# Patient Record
Sex: MALE | Race: NATIVE HAWAIIAN OR OTHER PACIFIC ISLANDER
[De-identification: names, ages, dates, MRNs, and addresses within clinical notes are randomized per-mention and may not be internally consistent; named-entity substitution may affect disease eponyms.]

---

## 2019-12-22 ENCOUNTER — HOSPITAL ENCOUNTER (EMERGENCY)
Dept: HOSPITAL 15 - ER | Age: 49
Discharge: LEFT BEFORE DISCHARGE | End: 2019-12-22
Payer: MEDICARE

## 2019-12-22 DIAGNOSIS — L97.509: Primary | ICD-10-CM

## 2019-12-22 DIAGNOSIS — Z53.21: ICD-10-CM

## 2020-08-20 ENCOUNTER — HOSPITAL ENCOUNTER (INPATIENT)
Dept: HOSPITAL 15 - ER | Age: 50
LOS: 5 days | Discharge: HOME HEALTH | DRG: 871 | End: 2020-08-25
Attending: INTERNAL MEDICINE | Admitting: NURSE PRACTITIONER
Payer: MEDICARE

## 2020-08-20 VITALS — BODY MASS INDEX: 33.69 KG/M2 | WEIGHT: 254.19 LBS | HEIGHT: 73 IN

## 2020-08-20 DIAGNOSIS — L97.529: ICD-10-CM
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
## 2020-08-20 DIAGNOSIS — E83.39: ICD-10-CM

## 2020-08-20 DIAGNOSIS — Z99.2: ICD-10-CM

## 2020-08-20 DIAGNOSIS — E11.621: ICD-10-CM

## 2020-08-20 DIAGNOSIS — I12.0: ICD-10-CM

## 2020-08-20 DIAGNOSIS — E78.5: ICD-10-CM

## 2020-08-20 DIAGNOSIS — N25.81: ICD-10-CM

## 2020-08-20 DIAGNOSIS — Z20.828: ICD-10-CM

## 2020-08-20 DIAGNOSIS — S91.302A: ICD-10-CM

## 2020-08-20 DIAGNOSIS — N18.6: ICD-10-CM

## 2020-08-20 DIAGNOSIS — Z83.3: ICD-10-CM

## 2020-08-20 DIAGNOSIS — E87.1: ICD-10-CM
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
## 2020-08-20 DIAGNOSIS — E87.5: ICD-10-CM
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
## 2020-08-20 DIAGNOSIS — E11.22: ICD-10-CM

## 2020-08-20 DIAGNOSIS — I48.20: ICD-10-CM

## 2020-08-20 DIAGNOSIS — A41.01: Primary | ICD-10-CM

## 2020-08-20 DIAGNOSIS — I77.810: ICD-10-CM
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
## 2020-08-20 DIAGNOSIS — E66.9: ICD-10-CM

## 2020-08-20 DIAGNOSIS — D64.9: ICD-10-CM

## 2020-08-20 LAB
ALBUMIN SERPL-MCNC: 3.8 G/DL (ref 3.4–5)
ALP SERPL-CCNC: 140 U/L (ref 45–117)
ALT SERPL-CCNC: 24 U/L (ref 16–61)
ANION GAP SERPL CALCULATED.3IONS-SCNC: 13 MMOL/L (ref 5–15)
APTT PPP: 33.1 SEC (ref 23–31.2)
BILIRUB SERPL-MCNC: 1.7 MG/DL (ref 0.2–1)
BUN SERPL-MCNC: 68 MG/DL (ref 7–18)
BUN/CREAT SERPL: 4.8
CALCIUM SERPL-MCNC: 9.5 MG/DL (ref 8.5–10.1)
CHLORIDE SERPL-SCNC: 92 MMOL/L (ref 98–107)
CO2 SERPL-SCNC: 20 MMOL/L (ref 21–32)
GLUCOSE SERPL-MCNC: 93 MG/DL (ref 74–106)
HCT VFR BLD AUTO: 43.8 % (ref 41–53)
HGB BLD-MCNC: 14.2 G/DL (ref 13.5–17.5)
INR PPP: 1.06 (ref 0.9–1.15)
MAGNESIUM SERPL-MCNC: 2.9 MG/DL (ref 1.6–2.6)
MCH RBC QN AUTO: 26.6 PG (ref 28–32)
MCV RBC AUTO: 81.9 FL (ref 80–100)
NRBC BLD QL AUTO: 0 %
POTASSIUM SERPL-SCNC: 6.5 MMOL/L (ref 3.5–5.1)
PROT SERPL-MCNC: 10.5 G/DL (ref 6.4–8.2)
SODIUM SERPL-SCNC: 125 MMOL/L (ref 136–145)

## 2020-08-20 PROCEDURE — 96375 TX/PRO/DX INJ NEW DRUG ADDON: CPT

## 2020-08-20 PROCEDURE — 71045 X-RAY EXAM CHEST 1 VIEW: CPT

## 2020-08-20 PROCEDURE — 36600 WITHDRAWAL OF ARTERIAL BLOOD: CPT

## 2020-08-20 PROCEDURE — 82728 ASSAY OF FERRITIN: CPT

## 2020-08-20 PROCEDURE — 85379 FIBRIN DEGRADATION QUANT: CPT

## 2020-08-20 PROCEDURE — 85610 PROTHROMBIN TIME: CPT

## 2020-08-20 PROCEDURE — 87426 SARSCOV CORONAVIRUS AG IA: CPT

## 2020-08-20 PROCEDURE — 85730 THROMBOPLASTIN TIME PARTIAL: CPT

## 2020-08-20 PROCEDURE — 85025 COMPLETE CBC W/AUTO DIFF WBC: CPT

## 2020-08-20 PROCEDURE — 02HV33Z INSERTION OF INFUSION DEVICE INTO SUPERIOR VENA CAVA, PERCUTANEOUS APPROACH: ICD-10-PCS | Performed by: STUDENT IN AN ORGANIZED HEALTH CARE EDUCATION/TRAINING PROGRAM

## 2020-08-20 PROCEDURE — 84443 ASSAY THYROID STIM HORMONE: CPT

## 2020-08-20 PROCEDURE — 82565 ASSAY OF CREATININE: CPT

## 2020-08-20 PROCEDURE — 87147 CULTURE TYPE IMMUNOLOGIC: CPT

## 2020-08-20 PROCEDURE — 87077 CULTURE AEROBIC IDENTIFY: CPT

## 2020-08-20 PROCEDURE — 96361 HYDRATE IV INFUSION ADD-ON: CPT

## 2020-08-20 PROCEDURE — 87081 CULTURE SCREEN ONLY: CPT

## 2020-08-20 PROCEDURE — 87040 BLOOD CULTURE FOR BACTERIA: CPT

## 2020-08-20 PROCEDURE — 83880 ASSAY OF NATRIURETIC PEPTIDE: CPT

## 2020-08-20 PROCEDURE — 86141 C-REACTIVE PROTEIN HS: CPT

## 2020-08-20 PROCEDURE — 84484 ASSAY OF TROPONIN QUANT: CPT

## 2020-08-20 PROCEDURE — 93005 ELECTROCARDIOGRAM TRACING: CPT

## 2020-08-20 PROCEDURE — 82805 BLOOD GASES W/O2 SATURATION: CPT

## 2020-08-20 PROCEDURE — 96367 TX/PROPH/DG ADDL SEQ IV INF: CPT

## 2020-08-20 PROCEDURE — 83605 ASSAY OF LACTIC ACID: CPT

## 2020-08-20 PROCEDURE — 87186 SC STD MICRODIL/AGAR DIL: CPT

## 2020-08-20 PROCEDURE — 80202 ASSAY OF VANCOMYCIN: CPT

## 2020-08-20 PROCEDURE — 96365 THER/PROPH/DIAG IV INF INIT: CPT

## 2020-08-20 PROCEDURE — 84132 ASSAY OF SERUM POTASSIUM: CPT

## 2020-08-20 PROCEDURE — 36415 COLL VENOUS BLD VENIPUNCTURE: CPT

## 2020-08-20 PROCEDURE — 93306 TTE W/DOPPLER COMPLETE: CPT

## 2020-08-20 PROCEDURE — 83735 ASSAY OF MAGNESIUM: CPT

## 2020-08-20 PROCEDURE — 80048 BASIC METABOLIC PNL TOTAL CA: CPT

## 2020-08-20 PROCEDURE — 80053 COMPREHEN METABOLIC PANEL: CPT

## 2020-08-20 PROCEDURE — 99291 CRITICAL CARE FIRST HOUR: CPT

## 2020-08-20 PROCEDURE — 73700 CT LOWER EXTREMITY W/O DYE: CPT

## 2020-08-21 VITALS — SYSTOLIC BLOOD PRESSURE: 129 MMHG | DIASTOLIC BLOOD PRESSURE: 79 MMHG

## 2020-08-21 VITALS — SYSTOLIC BLOOD PRESSURE: 156 MMHG | DIASTOLIC BLOOD PRESSURE: 67 MMHG

## 2020-08-21 VITALS — DIASTOLIC BLOOD PRESSURE: 97 MMHG | SYSTOLIC BLOOD PRESSURE: 137 MMHG

## 2020-08-21 VITALS — SYSTOLIC BLOOD PRESSURE: 84 MMHG | DIASTOLIC BLOOD PRESSURE: 53 MMHG

## 2020-08-21 VITALS — SYSTOLIC BLOOD PRESSURE: 133 MMHG | DIASTOLIC BLOOD PRESSURE: 66 MMHG

## 2020-08-21 VITALS — SYSTOLIC BLOOD PRESSURE: 121 MMHG | DIASTOLIC BLOOD PRESSURE: 61 MMHG

## 2020-08-21 PROCEDURE — 5A1D70Z PERFORMANCE OF URINARY FILTRATION, INTERMITTENT, LESS THAN 6 HOURS PER DAY: ICD-10-PCS | Performed by: STUDENT IN AN ORGANIZED HEALTH CARE EDUCATION/TRAINING PROGRAM

## 2020-08-21 RX ADMIN — ALBUTEROL SULFATE SCH MCG: 108 AEROSOL, METERED RESPIRATORY (INHALATION) at 22:00

## 2020-08-21 RX ADMIN — ALBUTEROL SULFATE SCH MCG: 108 AEROSOL, METERED RESPIRATORY (INHALATION) at 07:30

## 2020-08-21 RX ADMIN — PANTOPRAZOLE SODIUM SCH MG: 40 TABLET, DELAYED RELEASE ORAL at 11:00

## 2020-08-21 RX ADMIN — PIPERACILLIN SODIUM AND TAZOBACTAM SODIUM SCH MLS/HR: .25; 2 INJECTION, POWDER, LYOPHILIZED, FOR SOLUTION INTRAVENOUS at 11:00

## 2020-08-21 RX ADMIN — ALBUTEROL SULFATE SCH MCG: 108 AEROSOL, METERED RESPIRATORY (INHALATION) at 14:20

## 2020-08-21 RX ADMIN — PIPERACILLIN SODIUM AND TAZOBACTAM SODIUM SCH MLS/HR: .25; 2 INJECTION, POWDER, LYOPHILIZED, FOR SOLUTION INTRAVENOUS at 21:30

## 2020-08-21 NOTE — NUR
HR SUSTAINING:

'S PATIENT NOW REPORTING," FEELING BAD." O2 96% ON 4LNC. /67MMHG, TEMP 100/7 RR 
20. MD BURROUGHS PAGED. ORDERS RECEIVED.

## 2020-08-21 NOTE — NUR
MD JOHNSON REGARDING BP:

ORDER TO HOLD BP MEDS. ALSO QUESTIONED FOR ACCURACY. WILL RE-ASSESS BP WITH MORE PATIENT 
EDUCATION TO INCREASE COMPLIANCE.

## 2020-08-21 NOTE — NUR
ROUNDS



Patient's temperature is 97.9.  Patient is resting in bed saturating at 98% on 2LNC. No 
distress noted and patient denies pain.  Patient continues on amiodarone drip.  Will adjust 
dose after 6 hours from the start of infusion as ordered.

## 2020-08-21 NOTE — NUR
OPENING SHIFT NOTE:

PATIENT RESTING IN BED, EATING BREAKFAST. A/OX4 PATIENT RESPIRATIONS EVEN AND UNLABORED. 
UPDATED CARE BOARD, FALL PRECAUTIONS IN PLACE, CALL LIGHT WITHIN REACH, WILL CONTINUE TO 
MONITOR.

## 2020-08-21 NOTE — NUR
MD CALLED BACK

[facility] CALL[facility] BACK AND STATES THAT THEY WILL PUT AN ORDER TO DO DIALYSIS.

## 2020-08-21 NOTE — NUR
DIALYSIS:

CALL FROM [facility] WITH [facility]. WILL BE AT HOSPITAL IN APPROX 1 HOUR FOR DIALYSIS 
TREATMENT.

## 2020-08-21 NOTE — NUR
NEW IV:

AFTER SECOND ATTEMPT, PIV STATED IN THE LEFT FA, 22G. EDUCATION GIVEN REGARDING NEW IV CARE.

## 2020-08-21 NOTE — NUR
Received a call from Kenya Acosta CM at  [facility] and stated if we need anything from 
them to call and they will help arrange when the patient is ready to go home. [facility] 
[phone].

## 2020-08-21 NOTE — NUR
MD CALLED



DR DACOSTA CALLED AND ASKED RN TO CALL DR [facility] RIGHT AWAY R/T PATIENT'S POTASSIUM OF 6.5 AT 
2100 8/20/20

## 2020-08-21 NOTE — NUR
Opening Shift Note

Received report from twila Camargo RN.  Assumed care of patient, awake and alert.  No S/S of 
distress/SOB or pain.  Instructed on POC and to call for assist PRN, will continue to 
monitor for changes Q1hr and PRN.

## 2020-08-21 NOTE — NUR
Respiratory note:

MEDICATION HELD AT THIS TIME UNTIL COVID RESULTS RETURN. PT CURRENTLY ON NC2L SPO2 100%, HR 
84, RR 20. BS CLEAR. NO SOB OR DISTRESS NOTED.

## 2020-08-21 NOTE — NUR
Nutrition Consult/assessment Note



please see attached link for complete assessment



Est energy needs ABW 99 [id number] kcal (27-30kcal/kg ABW) Est protein needs: 118-128g 
(1.2-1.3g/kg ABW r/t HD wounds) Will reassess prn. 




-------------------------------------------------------------------------------

Addendum: 20 at 1244 by Jessica Santana RD

-------------------------------------------------------------------------------

Amended: Links added.

## 2020-08-21 NOTE — NUR
BP:

PATIENT BP REPORTED FROM CNA 89/56MMHG AFTER SEVERAL ATTEMPTS AFTER, "THE MACHINE WASN'T 
READING HIM." THIS RN CONNECTED LEFT FA TO REMOTE BP TELE MONITOR, NOTED TO BE AT 68/28 THIS 
RN SUSPECTING POOR ACCURACY READING. PATIENT ASYMPTOMATIC, A/OX4. WILL INFORM MD.

## 2020-08-21 NOTE — NUR
IV SELF DISCONTINUED:

BACK FROM LUNCH, DIALYSIS RN REPORTED HE PULLED OUT HIS IV WHEN TURNING IN BED. SHE APPLIED 
MANUAL PRESSURE, CATHETER INTACT.

## 2020-08-21 NOTE — NUR
Patient is restless and states he does not feel good. Given some cooling measures, ice 
packs. Will monitor temperature.   Patient is asking for a drink of juice and some ice 
chips.

## 2020-08-21 NOTE — NUR
Respiratory note:

PT IS AWAKE, AND ALERT.  NO RESPIRATORY DISTRESS NOTED.   MDI THERAPY HELD DUE TO PENDING 
TEST RESULTS.  SPO2 95% ON RA, HR 96, RR 18, BS CLEAR/DIMINISHED BILATERALLY.  NO FURTHER 
RESPIRATORY INTERVENTIONS INDICATED AT THIS TIME.  WILL CONTINUE TO MONITOR PT.  CHARTING 
COMPLETE FROM OUTSIDE OF PT ROOM PER COVID-19 PRECAUTIONS/PROTOCOL.

## 2020-08-21 NOTE — NUR
Respiratory note:

PT IS AWAKE, AND ALERT.  NO RESPIRATORY DISTRESS NOTED.   MDI THERAPY HELD DUE TO PENDING 
TEST RESULTS.  SPO2 98% ON RA, HR 89, RR 18, BS CLEAR/DIMINISHED BILATERALLY.  NO FURTHER 
RESPIRATORY INTERVENTIONS INDICATED AT THIS TIME.  WILL CONTINUE TO MONITOR PT.  CHARTING 
COMPLETE FROM OUTSIDE OF PT ROOM PER COVID-19 PRECAUTIONS/PROTOCOL.

## 2020-08-21 NOTE — NUR
MEDICATION RECONCILIATION



PATIENT STATES HE IS TAKING [place] BUT CANNOT REMEMBER WHAT DOSE.  PATIENT CANNOT REMEMBER 
HIS WIFE'S TELEPHONE NUMBER AND REFUSED TO HAVE PASSWORD ON FILE.

## 2020-08-21 NOTE — NUR
WOUND CARE PICS:

REPORT RECEIVED FROM CONCHA HERNANDEZ. PER CONCHA, SHE TOOK WOUND CARE PICS AND PLACED A DIETARY 
CONSULT.

## 2020-08-21 NOTE — NUR
Telemetry admit from ER

ESTEBAN STANTON admitted to Telemetry unit after SBAR received.  Patient oriented to CONCHA CASTREJON RN primary RN, unit, room, bed, and unit policies regarding patient care and visiting 
hours. Patient now on continuous telemetry monitoring, tele box # 10 and telemetry reading 
on arrival to unit is Sinus Rhythm at 86BPM. Patient placed on bedside oxygen, weighed by 
bedscale and encouraged to call if they need something. All questions and concerns 
addressed, patient verbalized understanding. 

Note: Patient is alert and oriented, very tired and sleepy, hard to communicate. Patient is 
saturating 98% on room air, no distress noted.  Patient has wound to left lateral foot, no 
drainage noted, no odor noted and dressing changed.

## 2020-08-21 NOTE — NUR
WOUND CARE NOTE: WOUND CARE IN TO SEE PATIENT PER WOUND CARE REQUEST. PATIENT ADMITTED TO 
[facility] FOR SEPSIS. BEDSIDE NURSE NOTED SKIN INTEGRITY ISSUE UPON ADMISSION. PHOTOGRAPH TAKEN AT 
THAT TIME FOR REFERENCE. PATIENT HAS A DIABETIC FOOT ULCER TO THE LATERAL ASPECT OF THE LEFT 
FOOT. PATIENT RACHEL SCORE IS 19. NO OTHER WOUNDS NOTED. 



RECOMMEND: PODIATRY CONSULT. SKIN/WOUND CARE PLAN. CONTINUED MONITORING BY WOUND CARE TEAM.

-------------------------------------------------------------------------------

Addendum: 08/21/20 at 1736 by HERNAN AVINA RN RN

-------------------------------------------------------------------------------

Amended: Links added.

## 2020-08-22 VITALS — DIASTOLIC BLOOD PRESSURE: 79 MMHG | SYSTOLIC BLOOD PRESSURE: 164 MMHG

## 2020-08-22 VITALS — DIASTOLIC BLOOD PRESSURE: 55 MMHG | SYSTOLIC BLOOD PRESSURE: 103 MMHG

## 2020-08-22 VITALS — DIASTOLIC BLOOD PRESSURE: 48 MMHG | SYSTOLIC BLOOD PRESSURE: 92 MMHG

## 2020-08-22 VITALS — SYSTOLIC BLOOD PRESSURE: 121 MMHG | DIASTOLIC BLOOD PRESSURE: 81 MMHG

## 2020-08-22 VITALS — SYSTOLIC BLOOD PRESSURE: 95 MMHG | DIASTOLIC BLOOD PRESSURE: 57 MMHG

## 2020-08-22 LAB
ALBUMIN SERPL-MCNC: 2.5 G/DL (ref 3.4–5)
ALP SERPL-CCNC: 133 U/L (ref 45–117)
ALT SERPL-CCNC: 24 U/L (ref 16–61)
ANION GAP SERPL CALCULATED.3IONS-SCNC: 8 MMOL/L (ref 5–15)
BILIRUB SERPL-MCNC: 1.3 MG/DL (ref 0.2–1)
BUN SERPL-MCNC: 55 MG/DL (ref 7–18)
BUN/CREAT SERPL: 4.4
CALCIUM SERPL-MCNC: 8.7 MG/DL (ref 8.5–10.1)
CHLORIDE SERPL-SCNC: 93 MMOL/L (ref 98–107)
CO2 SERPL-SCNC: 26 MMOL/L (ref 21–32)
GLUCOSE SERPL-MCNC: 108 MG/DL (ref 74–106)
HCT VFR BLD AUTO: 36.1 % (ref 41–53)
HGB BLD-MCNC: 11.7 G/DL (ref 13.5–17.5)
MCH RBC QN AUTO: 26.4 PG (ref 28–32)
MCV RBC AUTO: 81.2 FL (ref 80–100)
NRBC BLD QL AUTO: 0.1 %
POTASSIUM SERPL-SCNC: 4.3 MMOL/L (ref 3.5–5.1)
PROT SERPL-MCNC: 7.7 G/DL (ref 6.4–8.2)
SODIUM SERPL-SCNC: 127 MMOL/L (ref 136–145)

## 2020-08-22 RX ADMIN — PIPERACILLIN SODIUM AND TAZOBACTAM SODIUM SCH MLS/HR: .25; 2 INJECTION, POWDER, LYOPHILIZED, FOR SOLUTION INTRAVENOUS at 09:32

## 2020-08-22 RX ADMIN — AMIODARONE HYDROCHLORIDE SCH MLS/HR: 50 INJECTION, SOLUTION INTRAVENOUS at 04:33

## 2020-08-22 RX ADMIN — PANTOPRAZOLE SODIUM SCH MG: 40 TABLET, DELAYED RELEASE ORAL at 09:06

## 2020-08-22 RX ADMIN — AMIODARONE HYDROCHLORIDE SCH MLS/HR: 50 INJECTION, SOLUTION INTRAVENOUS at 13:07

## 2020-08-22 RX ADMIN — PIPERACILLIN SODIUM AND TAZOBACTAM SODIUM SCH MLS/HR: .25; 2 INJECTION, POWDER, LYOPHILIZED, FOR SOLUTION INTRAVENOUS at 22:03

## 2020-08-22 NOTE — NUR
Pt Arrived on Unit

Pt arrived on unit to 292B from 244-2 via wheelchair. Pt is a/ox4 with no s/s of distress or 
SOB. Pt placed on 2L via NC. Amiodarone drip currently running at 16.67mL/hr. Pt able to 
ambulate to bed without difficulty. Safety measures initiated with call light within reach, 
bed in lowest position and side rails up. Will continue to monitor.

## 2020-08-22 NOTE — NUR
Received report from lab by Maria D that patient's  Creatinine is 12.40 down from 2 days ago 
of 14.10.  Hospitalist paged.  Awaiting call back.  twila Kang nurse notified.

## 2020-08-22 NOTE — NUR
Started amiodarone drip dose of 0.5mg/hour.  Patient is alert and oriented, no distress 
noted and patient denies pain.

## 2020-08-22 NOTE — NUR
PT RESULTED NEGATIVE FOR COVID INFECTION. CALLED TELE NURSE TO GIVE REPORT. PT TRANSPORTED 
TO TELE FLOOR WITH DAVID MEDEIROS ACCEPTING PT FROM COVID UNIT. NO S/S OF SOB OR DISTRESS AT TIME 
OF TRANSFER.

## 2020-08-23 VITALS — SYSTOLIC BLOOD PRESSURE: 125 MMHG | DIASTOLIC BLOOD PRESSURE: 76 MMHG

## 2020-08-23 VITALS — DIASTOLIC BLOOD PRESSURE: 84 MMHG | SYSTOLIC BLOOD PRESSURE: 151 MMHG

## 2020-08-23 VITALS — DIASTOLIC BLOOD PRESSURE: 81 MMHG | SYSTOLIC BLOOD PRESSURE: 146 MMHG

## 2020-08-23 VITALS — SYSTOLIC BLOOD PRESSURE: 147 MMHG | DIASTOLIC BLOOD PRESSURE: 72 MMHG

## 2020-08-23 VITALS — DIASTOLIC BLOOD PRESSURE: 74 MMHG | SYSTOLIC BLOOD PRESSURE: 166 MMHG

## 2020-08-23 VITALS — SYSTOLIC BLOOD PRESSURE: 133 MMHG | DIASTOLIC BLOOD PRESSURE: 58 MMHG

## 2020-08-23 LAB
ALBUMIN SERPL-MCNC: 2.6 G/DL (ref 3.4–5)
ALP SERPL-CCNC: 174 U/L (ref 45–117)
ALT SERPL-CCNC: 23 U/L (ref 16–61)
ANION GAP SERPL CALCULATED.3IONS-SCNC: 13 MMOL/L (ref 5–15)
BILIRUB SERPL-MCNC: 1 MG/DL (ref 0.2–1)
BUN SERPL-MCNC: 59 MG/DL (ref 7–18)
BUN/CREAT SERPL: 4.2
CALCIUM SERPL-MCNC: 8.4 MG/DL (ref 8.5–10.1)
CHLORIDE SERPL-SCNC: 92 MMOL/L (ref 98–107)
CO2 SERPL-SCNC: 22 MMOL/L (ref 21–32)
GLUCOSE SERPL-MCNC: 87 MG/DL (ref 74–106)
HCT VFR BLD AUTO: 33.5 % (ref 41–53)
HGB BLD-MCNC: 11.4 G/DL (ref 13.5–17.5)
MCH RBC QN AUTO: 27.1 PG (ref 28–32)
MCV RBC AUTO: 80 FL (ref 80–100)
NRBC BLD QL AUTO: 0.1 %
POTASSIUM SERPL-SCNC: 4.5 MMOL/L (ref 3.5–5.1)
PROT SERPL-MCNC: 7.7 G/DL (ref 6.4–8.2)
SODIUM SERPL-SCNC: 127 MMOL/L (ref 136–145)

## 2020-08-23 RX ADMIN — METOPROLOL TARTRATE SCH MG: 25 TABLET, FILM COATED ORAL at 22:15

## 2020-08-23 RX ADMIN — AMIODARONE HYDROCHLORIDE SCH MLS/HR: 50 INJECTION, SOLUTION INTRAVENOUS at 03:45

## 2020-08-23 RX ADMIN — PIPERACILLIN SODIUM AND TAZOBACTAM SODIUM SCH MLS/HR: .25; 2 INJECTION, POWDER, LYOPHILIZED, FOR SOLUTION INTRAVENOUS at 08:46

## 2020-08-23 RX ADMIN — PANTOPRAZOLE SODIUM SCH MG: 40 TABLET, DELAYED RELEASE ORAL at 08:46

## 2020-08-23 RX ADMIN — METOPROLOL TARTRATE SCH MG: 25 TABLET, FILM COATED ORAL at 13:31

## 2020-08-23 RX ADMIN — PIPERACILLIN SODIUM AND TAZOBACTAM SODIUM SCH MLS/HR: .25; 2 INJECTION, POWDER, LYOPHILIZED, FOR SOLUTION INTRAVENOUS at 22:15

## 2020-08-23 NOTE — NUR
Critical Lab Value

Creatine of 14.1 reported, previously 12.4 yesterday, 8/22. Paged [facility] to report 
value. Pt is scheduled for dialysis today. Pt is currently asymptomatic. Will continue to 
monitor.

## 2020-08-23 NOTE — NUR
Opening Note

Assumed pt care from NOC RN. PT is a/ox4 with no s/s of distress or SOB. Pt is currently 
sitting upright in bed with no complaints at this time. Pt is currently still running 
Amiodarone at 16mL/hr, will notify MD of running medication. Discussed POC with pt and 
pending Dialysis orders, pt verbalized understanding. Safety measures maintained with call 
light within reach, bed in lowest position and side rails up. Will continue to monitor for 
changes.

## 2020-08-23 NOTE — NUR
Dr Swartz at Bedside

MD to see pt. Informed MD on amidarone MD chasidy states to continue this current bag and then 
to d/c. MD aware of pending podiatry as well as CT results. Will continue to monitor for 
changes.

## 2020-08-23 NOTE — NUR
Dialysis Complete

Per Dialysis RN, 2.5L removed, last BP of 147/72 with a HR of 63. Pt tolerated treatment 
well. Will continue to monitor.

## 2020-08-24 VITALS — SYSTOLIC BLOOD PRESSURE: 151 MMHG | DIASTOLIC BLOOD PRESSURE: 77 MMHG

## 2020-08-24 VITALS — DIASTOLIC BLOOD PRESSURE: 81 MMHG | SYSTOLIC BLOOD PRESSURE: 190 MMHG

## 2020-08-24 VITALS — SYSTOLIC BLOOD PRESSURE: 168 MMHG | DIASTOLIC BLOOD PRESSURE: 79 MMHG

## 2020-08-24 VITALS — DIASTOLIC BLOOD PRESSURE: 81 MMHG | SYSTOLIC BLOOD PRESSURE: 158 MMHG

## 2020-08-24 VITALS — SYSTOLIC BLOOD PRESSURE: 146 MMHG | DIASTOLIC BLOOD PRESSURE: 71 MMHG

## 2020-08-24 LAB
ANION GAP SERPL CALCULATED.3IONS-SCNC: 10 MMOL/L (ref 5–15)
BUN SERPL-MCNC: 44 MG/DL (ref 7–18)
BUN/CREAT SERPL: 3.6
CALCIUM SERPL-MCNC: 8.6 MG/DL (ref 8.5–10.1)
CHLORIDE SERPL-SCNC: 95 MMOL/L (ref 98–107)
CO2 SERPL-SCNC: 25 MMOL/L (ref 21–32)
GLUCOSE SERPL-MCNC: 98 MG/DL (ref 74–106)
POTASSIUM SERPL-SCNC: 4.5 MMOL/L (ref 3.5–5.1)
SODIUM SERPL-SCNC: 130 MMOL/L (ref 136–145)

## 2020-08-24 RX ADMIN — METOPROLOL TARTRATE SCH MG: 25 TABLET, FILM COATED ORAL at 22:00

## 2020-08-24 RX ADMIN — PANTOPRAZOLE SODIUM SCH MG: 40 TABLET, DELAYED RELEASE ORAL at 10:23

## 2020-08-24 RX ADMIN — METOPROLOL TARTRATE SCH MG: 25 TABLET, FILM COATED ORAL at 10:35

## 2020-08-24 RX ADMIN — PIPERACILLIN SODIUM AND TAZOBACTAM SODIUM SCH MLS/HR: .25; 2 INJECTION, POWDER, LYOPHILIZED, FOR SOLUTION INTRAVENOUS at 10:23

## 2020-08-24 RX ADMIN — PIPERACILLIN SODIUM AND TAZOBACTAM SODIUM SCH MLS/HR: .25; 2 INJECTION, POWDER, LYOPHILIZED, FOR SOLUTION INTRAVENOUS at 22:00

## 2020-08-24 NOTE — NUR
Nutrition Followup Note



Pt wt is 113.4 kg



Pt was sleeping with no relatives at bedside when rounded this morning. Pt is with a Renal 
Standard diet, appetite is good aeb ave 75% PO intake per RN doc.  Pt is active for HD.



Est energy needs ABW 99 [id number] kcal (27-30kcal/kg ABW) 

Est protein needs: 118-128g (1.2-1.3g/kg ABW r/t HD wounds) 

Will reassess prn. 



LABS:  BUN 59 H, Cr 14.10 H, GFR 4 L, Ca 8.4 L, Alb 2.6 L



GI: Pt had 1 BM on  per RN doc



BS: 19 low risk. Refer to wound assessment report for full details.



PES:

1) Altered nutrition related lab values r.t current chronic medical condition aeb elev RFT 
hyperbili

2) Decreased nutrient needs r/t adiposity aeb pt`s high BMI of 33.9 kg/m2



Comments 

1) Consider nephrovite daily and Vit C

2) Refer to [facility] dietitian on DC

3) Continue current plan of care

## 2020-08-24 NOTE — NUR
Received a call from Evangelina FOURNIER at [facility]  [phone]  and stated if the 
patient has any discharge needs to give her a call and she will help to arrange.

## 2020-08-24 NOTE — NUR
Opening Shift Note

Assumed care of patient, awake and alert x4. No S/S of distress/SOB or pain. Call light is 
within reach, side rails up x2, bed is in the lowest position. Instructed on POC and to call 
for assist PRN, will continue to monitor for changes Q1hr and PRN.

## 2020-08-25 VITALS — DIASTOLIC BLOOD PRESSURE: 73 MMHG | SYSTOLIC BLOOD PRESSURE: 157 MMHG

## 2020-08-25 VITALS — DIASTOLIC BLOOD PRESSURE: 70 MMHG | SYSTOLIC BLOOD PRESSURE: 155 MMHG

## 2020-08-25 VITALS — DIASTOLIC BLOOD PRESSURE: 60 MMHG | SYSTOLIC BLOOD PRESSURE: 140 MMHG

## 2020-08-25 VITALS — SYSTOLIC BLOOD PRESSURE: 164 MMHG | DIASTOLIC BLOOD PRESSURE: 77 MMHG

## 2020-08-25 LAB
HCT VFR BLD AUTO: 33.8 % (ref 41–53)
HGB BLD-MCNC: 11 G/DL (ref 13.5–17.5)
MCH RBC QN AUTO: 26.5 PG (ref 28–32)
MCV RBC AUTO: 81.1 FL (ref 80–100)
NRBC BLD QL AUTO: 0 %

## 2020-08-25 PROCEDURE — 5A1D70Z PERFORMANCE OF URINARY FILTRATION, INTERMITTENT, LESS THAN 6 HOURS PER DAY: ICD-10-PCS | Performed by: STUDENT IN AN ORGANIZED HEALTH CARE EDUCATION/TRAINING PROGRAM

## 2020-08-25 RX ADMIN — METOPROLOL TARTRATE SCH MG: 25 TABLET, FILM COATED ORAL at 10:00

## 2020-08-25 RX ADMIN — PANTOPRAZOLE SODIUM SCH MG: 40 TABLET, DELAYED RELEASE ORAL at 10:00

## 2020-08-25 NOTE — NUR
Critical lab Creatinine

Creatinine is 13.8, MD aware of elevated lab, patient is scheduled for hemodialysis today 
and nephrology is following.

## 2020-08-25 NOTE — NUR
Assessment 



Patient is a 49-year-old male who is alert and oriented. Prior to admission patient lived 
home with family and functioned with assistance. Per patient his wife helps him with his 
ADLs. Patient has a wheelchair for home use. Patient does not have any other medical 
equipment. Advised patient there is a social service consult for home health wound care. 
Patient informed me he has been on service with [facility]. Informed patient 
clinical information will be faxed to [facility] . Per patient he will return to his 
prior living arraignments post discharge and family will transport him home. Informed 
Patient he has a right to participate in all discharge planning. Patient verbalized 
understanding discharge plan.



 Faxed clinical information to [facility]. Per Jonathan with [facility] patient has been 
accepted and service to start within 24-48hrs upon d/c day.  

-------------------------------------------------------------------------------

Addendum: 08/25/20 at 1417 by MELISA VASQUEZ

-------------------------------------------------------------------------------

Amended: Links added.

-------------------------------------------------------------------------------

Addendum: 08/25/20 at 1437 by MELISA VASQUEZ

-------------------------------------------------------------------------------

Patient informed me he is on Dialysis with [facility] on T, Th, S at 4:45 am.

## 2020-08-25 NOTE — NUR
Spoke with Elizabet from [facility] called, she said she will be here to start the 
patient's dialysis around 07:30.

## 2021-04-18 ENCOUNTER — HOSPITAL ENCOUNTER (INPATIENT)
Dept: HOSPITAL 15 - ER | Age: 51
LOS: 9 days | Discharge: HOME HEALTH | DRG: 853 | End: 2021-04-27
Attending: INTERNAL MEDICINE | Admitting: NURSE PRACTITIONER
Payer: MEDICARE

## 2021-04-18 VITALS — HEIGHT: 73 IN | WEIGHT: 246.92 LBS | BODY MASS INDEX: 32.72 KG/M2

## 2021-04-18 VITALS — SYSTOLIC BLOOD PRESSURE: 165 MMHG | DIASTOLIC BLOOD PRESSURE: 75 MMHG

## 2021-04-18 DIAGNOSIS — E88.09: ICD-10-CM

## 2021-04-18 DIAGNOSIS — Z99.2: ICD-10-CM

## 2021-04-18 DIAGNOSIS — D63.8: ICD-10-CM

## 2021-04-18 DIAGNOSIS — Z20.822: ICD-10-CM

## 2021-04-18 DIAGNOSIS — E78.5: ICD-10-CM

## 2021-04-18 DIAGNOSIS — E11.621: ICD-10-CM

## 2021-04-18 DIAGNOSIS — B96.1: ICD-10-CM

## 2021-04-18 DIAGNOSIS — I12.0: ICD-10-CM

## 2021-04-18 DIAGNOSIS — Z83.3: ICD-10-CM

## 2021-04-18 DIAGNOSIS — N18.6: ICD-10-CM

## 2021-04-18 DIAGNOSIS — Z87.891: ICD-10-CM

## 2021-04-18 DIAGNOSIS — Z89.412: ICD-10-CM

## 2021-04-18 DIAGNOSIS — E87.1: ICD-10-CM

## 2021-04-18 DIAGNOSIS — M86.8X7: ICD-10-CM

## 2021-04-18 DIAGNOSIS — L03.032: ICD-10-CM

## 2021-04-18 DIAGNOSIS — E66.9: ICD-10-CM

## 2021-04-18 DIAGNOSIS — Z79.82: ICD-10-CM

## 2021-04-18 DIAGNOSIS — A41.01: Primary | ICD-10-CM

## 2021-04-18 DIAGNOSIS — E11.69: ICD-10-CM

## 2021-04-18 DIAGNOSIS — E11.22: ICD-10-CM

## 2021-04-18 DIAGNOSIS — E87.5: ICD-10-CM

## 2021-04-18 DIAGNOSIS — E11.51: ICD-10-CM

## 2021-04-18 LAB
ALBUMIN SERPL-MCNC: 2.8 G/DL (ref 3.4–5)
ALP SERPL-CCNC: 268 U/L (ref 45–117)
ALT SERPL-CCNC: 13 U/L (ref 16–61)
ANION GAP SERPL CALCULATED.3IONS-SCNC: 13 MMOL/L (ref 5–15)
BILIRUB SERPL-MCNC: 0.6 MG/DL (ref 0.2–1)
BUN SERPL-MCNC: 64 MG/DL (ref 7–18)
BUN/CREAT SERPL: 4.6
CALCIUM SERPL-MCNC: 9.4 MG/DL (ref 8.5–10.1)
CHLORIDE SERPL-SCNC: 94 MMOL/L (ref 98–107)
CO2 SERPL-SCNC: 23 MMOL/L (ref 21–32)
GLUCOSE SERPL-MCNC: 107 MG/DL (ref 74–106)
HCT VFR BLD AUTO: 35.2 % (ref 41–53)
HGB BLD-MCNC: 11.8 G/DL (ref 13.5–17.5)
MCH RBC QN AUTO: 27 PG (ref 28–32)
MCV RBC AUTO: 80.4 FL (ref 80–100)
NRBC BLD QL AUTO: 0 %
POTASSIUM SERPL-SCNC: 5.1 MMOL/L (ref 3.5–5.1)
PROT SERPL-MCNC: 9.1 G/DL (ref 6.4–8.2)
SODIUM SERPL-SCNC: 130 MMOL/L (ref 136–145)

## 2021-04-18 PROCEDURE — 87075 CULTR BACTERIA EXCEPT BLOOD: CPT

## 2021-04-18 PROCEDURE — 73718 MRI LOWER EXTREMITY W/O DYE: CPT

## 2021-04-18 PROCEDURE — 87205 SMEAR GRAM STAIN: CPT

## 2021-04-18 PROCEDURE — 93970 EXTREMITY STUDY: CPT

## 2021-04-18 PROCEDURE — 99291 CRITICAL CARE FIRST HOUR: CPT

## 2021-04-18 PROCEDURE — 85025 COMPLETE CBC W/AUTO DIFF WBC: CPT

## 2021-04-18 PROCEDURE — 87076 CULTURE ANAEROBE IDENT EACH: CPT

## 2021-04-18 PROCEDURE — 99152 MOD SED SAME PHYS/QHP 5/>YRS: CPT

## 2021-04-18 PROCEDURE — 87426 SARSCOV CORONAVIRUS AG IA: CPT

## 2021-04-18 PROCEDURE — 84132 ASSAY OF SERUM POTASSIUM: CPT

## 2021-04-18 PROCEDURE — 87040 BLOOD CULTURE FOR BACTERIA: CPT

## 2021-04-18 PROCEDURE — 86850 RBC ANTIBODY SCREEN: CPT

## 2021-04-18 PROCEDURE — 83605 ASSAY OF LACTIC ACID: CPT

## 2021-04-18 PROCEDURE — 71045 X-RAY EXAM CHEST 1 VIEW: CPT

## 2021-04-18 PROCEDURE — 99153 MOD SED SAME PHYS/QHP EA: CPT

## 2021-04-18 PROCEDURE — 87070 CULTURE OTHR SPECIMN AEROBIC: CPT

## 2021-04-18 PROCEDURE — 96376 TX/PRO/DX INJ SAME DRUG ADON: CPT

## 2021-04-18 PROCEDURE — 86900 BLOOD TYPING SEROLOGIC ABO: CPT

## 2021-04-18 PROCEDURE — 86901 BLOOD TYPING SEROLOGIC RH(D): CPT

## 2021-04-18 PROCEDURE — 80053 COMPREHEN METABOLIC PANEL: CPT

## 2021-04-18 PROCEDURE — 96361 HYDRATE IV INFUSION ADD-ON: CPT

## 2021-04-18 PROCEDURE — 80202 ASSAY OF VANCOMYCIN: CPT

## 2021-04-18 PROCEDURE — 73700 CT LOWER EXTREMITY W/O DYE: CPT

## 2021-04-18 PROCEDURE — 87186 SC STD MICRODIL/AGAR DIL: CPT

## 2021-04-18 PROCEDURE — 85730 THROMBOPLASTIN TIME PARTIAL: CPT

## 2021-04-18 PROCEDURE — 80048 BASIC METABOLIC PNL TOTAL CA: CPT

## 2021-04-18 PROCEDURE — 87077 CULTURE AEROBIC IDENTIFY: CPT

## 2021-04-18 PROCEDURE — 83036 HEMOGLOBIN GLYCOSYLATED A1C: CPT

## 2021-04-18 PROCEDURE — 87081 CULTURE SCREEN ONLY: CPT

## 2021-04-18 PROCEDURE — 96375 TX/PRO/DX INJ NEW DRUG ADDON: CPT

## 2021-04-18 PROCEDURE — 85610 PROTHROMBIN TIME: CPT

## 2021-04-18 PROCEDURE — 96374 THER/PROPH/DIAG INJ IV PUSH: CPT

## 2021-04-18 PROCEDURE — 36569 INSJ PICC 5 YR+ W/O IMAGING: CPT

## 2021-04-18 PROCEDURE — 36415 COLL VENOUS BLD VENIPUNCTURE: CPT

## 2021-04-18 PROCEDURE — 5A1D70Z PERFORMANCE OF URINARY FILTRATION, INTERMITTENT, LESS THAN 6 HOURS PER DAY: ICD-10-PCS | Performed by: INTERNAL MEDICINE

## 2021-04-18 PROCEDURE — 82962 GLUCOSE BLOOD TEST: CPT

## 2021-04-18 PROCEDURE — 93925 LOWER EXTREMITY STUDY: CPT

## 2021-04-18 PROCEDURE — 90935 HEMODIALYSIS ONE EVALUATION: CPT

## 2021-04-18 RX ADMIN — Medication SCH STRIP: at 22:15

## 2021-04-18 RX ADMIN — HUMAN INSULIN SCH UNITS: 100 INJECTION, SOLUTION SUBCUTANEOUS at 22:00

## 2021-04-18 RX ADMIN — HYDROCODONE BITARTRATE AND ACETAMINOPHEN PRN TAB: 5; 325 TABLET ORAL at 21:52

## 2021-04-19 VITALS — DIASTOLIC BLOOD PRESSURE: 66 MMHG | SYSTOLIC BLOOD PRESSURE: 157 MMHG

## 2021-04-19 VITALS — DIASTOLIC BLOOD PRESSURE: 70 MMHG | SYSTOLIC BLOOD PRESSURE: 153 MMHG

## 2021-04-19 VITALS — DIASTOLIC BLOOD PRESSURE: 69 MMHG | SYSTOLIC BLOOD PRESSURE: 139 MMHG

## 2021-04-19 VITALS — DIASTOLIC BLOOD PRESSURE: 73 MMHG | SYSTOLIC BLOOD PRESSURE: 164 MMHG

## 2021-04-19 VITALS — DIASTOLIC BLOOD PRESSURE: 80 MMHG | SYSTOLIC BLOOD PRESSURE: 174 MMHG

## 2021-04-19 RX ADMIN — LABETALOL HYDROCHLORIDE PRN MG: 5 INJECTION, SOLUTION INTRAVENOUS at 12:46

## 2021-04-19 RX ADMIN — Medication SCH STRIP: at 22:00

## 2021-04-19 RX ADMIN — MORPHINE SULFATE PRN MG: 2 INJECTION, SOLUTION INTRAMUSCULAR; INTRAVENOUS at 09:03

## 2021-04-19 RX ADMIN — ATORVASTATIN CALCIUM SCH MG: 20 TABLET, FILM COATED ORAL at 21:51

## 2021-04-19 RX ADMIN — HUMAN INSULIN SCH UNITS: 100 INJECTION, SOLUTION SUBCUTANEOUS at 05:38

## 2021-04-19 RX ADMIN — HUMAN INSULIN SCH UNITS: 100 INJECTION, SOLUTION SUBCUTANEOUS at 17:57

## 2021-04-19 RX ADMIN — HUMAN INSULIN SCH UNITS: 100 INJECTION, SOLUTION SUBCUTANEOUS at 22:00

## 2021-04-19 RX ADMIN — HYDROCODONE BITARTRATE AND ACETAMINOPHEN PRN TAB: 5; 325 TABLET ORAL at 21:52

## 2021-04-19 RX ADMIN — Medication SCH STRIP: at 05:38

## 2021-04-19 RX ADMIN — CEFTRIAXONE SODIUM SCH MLS/HR: 1 INJECTION, POWDER, FOR SOLUTION INTRAMUSCULAR; INTRAVENOUS at 09:03

## 2021-04-19 RX ADMIN — Medication SCH STRIP: at 17:56

## 2021-04-19 RX ADMIN — HUMAN INSULIN SCH UNITS: 100 INJECTION, SOLUTION SUBCUTANEOUS at 12:12

## 2021-04-19 RX ADMIN — HYDROCODONE BITARTRATE AND ACETAMINOPHEN PRN TAB: 5; 325 TABLET ORAL at 03:56

## 2021-04-19 RX ADMIN — FAMOTIDINE SCH MG: 20 TABLET, FILM COATED ORAL at 10:07

## 2021-04-19 RX ADMIN — Medication SCH STRIP: at 12:12

## 2021-04-19 RX ADMIN — MORPHINE SULFATE PRN MG: 2 INJECTION, SOLUTION INTRAMUSCULAR; INTRAVENOUS at 02:04

## 2021-04-20 VITALS — DIASTOLIC BLOOD PRESSURE: 91 MMHG | SYSTOLIC BLOOD PRESSURE: 191 MMHG

## 2021-04-20 VITALS — SYSTOLIC BLOOD PRESSURE: 160 MMHG | DIASTOLIC BLOOD PRESSURE: 75 MMHG

## 2021-04-20 VITALS — SYSTOLIC BLOOD PRESSURE: 156 MMHG | DIASTOLIC BLOOD PRESSURE: 82 MMHG

## 2021-04-20 VITALS — SYSTOLIC BLOOD PRESSURE: 123 MMHG | DIASTOLIC BLOOD PRESSURE: 48 MMHG

## 2021-04-20 LAB
ANION GAP SERPL CALCULATED.3IONS-SCNC: 10 MMOL/L (ref 5–15)
APTT PPP: 33.3 SEC (ref 23–31.2)
BUN SERPL-MCNC: 56 MG/DL (ref 7–18)
BUN/CREAT SERPL: 4.3
CALCIUM SERPL-MCNC: 9.2 MG/DL (ref 8.5–10.1)
CHLORIDE SERPL-SCNC: 95 MMOL/L (ref 98–107)
CO2 SERPL-SCNC: 25 MMOL/L (ref 21–32)
GLUCOSE SERPL-MCNC: 125 MG/DL (ref 74–106)
HCT VFR BLD AUTO: 33.1 % (ref 41–53)
HGB BLD-MCNC: 11.1 G/DL (ref 13.5–17.5)
INR PPP: 1.11 (ref 0.9–1.15)
MCH RBC QN AUTO: 26.5 PG (ref 28–32)
MCV RBC AUTO: 79.4 FL (ref 80–100)
NRBC BLD QL AUTO: 0 %
POTASSIUM SERPL-SCNC: 5 MMOL/L (ref 3.5–5.1)
SODIUM SERPL-SCNC: 130 MMOL/L (ref 136–145)

## 2021-04-20 PROCEDURE — 02HV33Z INSERTION OF INFUSION DEVICE INTO SUPERIOR VENA CAVA, PERCUTANEOUS APPROACH: ICD-10-PCS

## 2021-04-20 PROCEDURE — B548ZZA ULTRASONOGRAPHY OF SUPERIOR VENA CAVA, GUIDANCE: ICD-10-PCS

## 2021-04-20 RX ADMIN — CEFTRIAXONE SODIUM SCH MLS/HR: 1 INJECTION, POWDER, FOR SOLUTION INTRAMUSCULAR; INTRAVENOUS at 10:18

## 2021-04-20 RX ADMIN — ATORVASTATIN CALCIUM SCH MG: 20 TABLET, FILM COATED ORAL at 21:38

## 2021-04-20 RX ADMIN — Medication SCH STRIP: at 13:07

## 2021-04-20 RX ADMIN — Medication SCH STRIP: at 05:02

## 2021-04-20 RX ADMIN — MEROPENEM SCH MLS/HR: 500 INJECTION INTRAVENOUS at 21:37

## 2021-04-20 RX ADMIN — HYDROCODONE BITARTRATE AND ACETAMINOPHEN PRN TAB: 5; 325 TABLET ORAL at 08:23

## 2021-04-20 RX ADMIN — HUMAN INSULIN SCH UNITS: 100 INJECTION, SOLUTION SUBCUTANEOUS at 21:52

## 2021-04-20 RX ADMIN — SODIUM CHLORIDE SCH ML: 9 INJECTION INTRAMUSCULAR; INTRAVENOUS; SUBCUTANEOUS at 21:37

## 2021-04-20 RX ADMIN — MORPHINE SULFATE PRN MG: 2 INJECTION, SOLUTION INTRAMUSCULAR; INTRAVENOUS at 04:54

## 2021-04-20 RX ADMIN — HUMAN INSULIN SCH UNITS: 100 INJECTION, SOLUTION SUBCUTANEOUS at 13:07

## 2021-04-20 RX ADMIN — MORPHINE SULFATE PRN MG: 2 INJECTION, SOLUTION INTRAMUSCULAR; INTRAVENOUS at 15:07

## 2021-04-20 RX ADMIN — Medication SCH STRIP: at 21:52

## 2021-04-20 RX ADMIN — Medication SCH STRIP: at 17:37

## 2021-04-20 RX ADMIN — HUMAN INSULIN SCH UNITS: 100 INJECTION, SOLUTION SUBCUTANEOUS at 05:02

## 2021-04-20 RX ADMIN — HUMAN INSULIN SCH UNITS: 100 INJECTION, SOLUTION SUBCUTANEOUS at 17:37

## 2021-04-20 RX ADMIN — LABETALOL HYDROCHLORIDE PRN MG: 5 INJECTION, SOLUTION INTRAVENOUS at 13:30

## 2021-04-21 VITALS — SYSTOLIC BLOOD PRESSURE: 170 MMHG | DIASTOLIC BLOOD PRESSURE: 70 MMHG

## 2021-04-21 VITALS — DIASTOLIC BLOOD PRESSURE: 49 MMHG | SYSTOLIC BLOOD PRESSURE: 130 MMHG

## 2021-04-21 VITALS — DIASTOLIC BLOOD PRESSURE: 51 MMHG | SYSTOLIC BLOOD PRESSURE: 135 MMHG

## 2021-04-21 VITALS — SYSTOLIC BLOOD PRESSURE: 160 MMHG | DIASTOLIC BLOOD PRESSURE: 88 MMHG

## 2021-04-21 VITALS — DIASTOLIC BLOOD PRESSURE: 50 MMHG | SYSTOLIC BLOOD PRESSURE: 132 MMHG

## 2021-04-21 VITALS — DIASTOLIC BLOOD PRESSURE: 47 MMHG | SYSTOLIC BLOOD PRESSURE: 140 MMHG

## 2021-04-21 VITALS — SYSTOLIC BLOOD PRESSURE: 118 MMHG | DIASTOLIC BLOOD PRESSURE: 50 MMHG

## 2021-04-21 VITALS — DIASTOLIC BLOOD PRESSURE: 49 MMHG | SYSTOLIC BLOOD PRESSURE: 134 MMHG

## 2021-04-21 VITALS — DIASTOLIC BLOOD PRESSURE: 110 MMHG | SYSTOLIC BLOOD PRESSURE: 139 MMHG

## 2021-04-21 VITALS — DIASTOLIC BLOOD PRESSURE: 50 MMHG | SYSTOLIC BLOOD PRESSURE: 124 MMHG

## 2021-04-21 LAB
ANION GAP SERPL CALCULATED.3IONS-SCNC: 15 MMOL/L (ref 5–15)
APTT PPP: 33.3 SEC (ref 23–31.2)
BUN SERPL-MCNC: 64 MG/DL (ref 7–18)
BUN/CREAT SERPL: 4.2
CALCIUM SERPL-MCNC: 9.2 MG/DL (ref 8.5–10.1)
CHLORIDE SERPL-SCNC: 93 MMOL/L (ref 98–107)
CO2 SERPL-SCNC: 23 MMOL/L (ref 21–32)
GLUCOSE SERPL-MCNC: 70 MG/DL (ref 74–106)
HCT VFR BLD AUTO: 32.6 % (ref 41–53)
HGB BLD-MCNC: 10.7 G/DL (ref 13.5–17.5)
INR PPP: 1.12 (ref 0.9–1.15)
MCH RBC QN AUTO: 26.3 PG (ref 28–32)
MCV RBC AUTO: 80.3 FL (ref 80–100)
NRBC BLD QL AUTO: 0.1 %
POTASSIUM SERPL-SCNC: 5.5 MMOL/L (ref 3.5–5.1)
SODIUM SERPL-SCNC: 131 MMOL/L (ref 136–145)

## 2021-04-21 PROCEDURE — B41FYZZ FLUOROSCOPY OF RIGHT LOWER EXTREMITY ARTERIES USING OTHER CONTRAST: ICD-10-PCS | Performed by: INTERNAL MEDICINE

## 2021-04-21 PROCEDURE — B41GYZZ FLUOROSCOPY OF LEFT LOWER EXTREMITY ARTERIES USING OTHER CONTRAST: ICD-10-PCS | Performed by: INTERNAL MEDICINE

## 2021-04-21 PROCEDURE — 047L34Z DILATION OF LEFT FEMORAL ARTERY WITH DRUG-ELUTING INTRALUMINAL DEVICE, PERCUTANEOUS APPROACH: ICD-10-PCS | Performed by: INTERNAL MEDICINE

## 2021-04-21 PROCEDURE — 5A1D70Z PERFORMANCE OF URINARY FILTRATION, INTERMITTENT, LESS THAN 6 HOURS PER DAY: ICD-10-PCS | Performed by: INTERNAL MEDICINE

## 2021-04-21 RX ADMIN — Medication SCH STRIP: at 11:30

## 2021-04-21 RX ADMIN — SODIUM CHLORIDE SCH ML: 9 INJECTION INTRAMUSCULAR; INTRAVENOUS; SUBCUTANEOUS at 10:00

## 2021-04-21 RX ADMIN — HUMAN INSULIN SCH UNITS: 100 INJECTION, SOLUTION SUBCUTANEOUS at 11:30

## 2021-04-21 RX ADMIN — HUMAN INSULIN SCH UNITS: 100 INJECTION, SOLUTION SUBCUTANEOUS at 17:00

## 2021-04-21 RX ADMIN — ATORVASTATIN CALCIUM SCH MG: 20 TABLET, FILM COATED ORAL at 22:30

## 2021-04-21 RX ADMIN — Medication SCH STRIP: at 22:00

## 2021-04-21 RX ADMIN — SODIUM CHLORIDE SCH ML: 9 INJECTION INTRAMUSCULAR; INTRAVENOUS; SUBCUTANEOUS at 22:00

## 2021-04-21 RX ADMIN — FAMOTIDINE SCH MG: 20 TABLET, FILM COATED ORAL at 10:00

## 2021-04-21 RX ADMIN — Medication SCH STRIP: at 06:10

## 2021-04-21 RX ADMIN — MEROPENEM SCH MLS/HR: 500 INJECTION INTRAVENOUS at 22:00

## 2021-04-21 RX ADMIN — Medication SCH STRIP: at 17:00

## 2021-04-21 RX ADMIN — MEROPENEM SCH MLS/HR: 500 INJECTION INTRAVENOUS at 10:00

## 2021-04-21 RX ADMIN — HYDROCODONE BITARTRATE AND ACETAMINOPHEN PRN TAB: 5; 325 TABLET ORAL at 04:08

## 2021-04-21 RX ADMIN — LABETALOL HYDROCHLORIDE PRN MG: 5 INJECTION, SOLUTION INTRAVENOUS at 04:09

## 2021-04-21 RX ADMIN — HUMAN INSULIN SCH UNITS: 100 INJECTION, SOLUTION SUBCUTANEOUS at 22:00

## 2021-04-21 RX ADMIN — HUMAN INSULIN SCH UNITS: 100 INJECTION, SOLUTION SUBCUTANEOUS at 06:10

## 2021-04-22 VITALS — DIASTOLIC BLOOD PRESSURE: 49 MMHG | SYSTOLIC BLOOD PRESSURE: 126 MMHG

## 2021-04-22 VITALS — SYSTOLIC BLOOD PRESSURE: 140 MMHG | DIASTOLIC BLOOD PRESSURE: 57 MMHG

## 2021-04-22 VITALS — DIASTOLIC BLOOD PRESSURE: 48 MMHG | SYSTOLIC BLOOD PRESSURE: 147 MMHG

## 2021-04-22 VITALS — DIASTOLIC BLOOD PRESSURE: 52 MMHG | SYSTOLIC BLOOD PRESSURE: 143 MMHG

## 2021-04-22 VITALS — DIASTOLIC BLOOD PRESSURE: 52 MMHG | SYSTOLIC BLOOD PRESSURE: 139 MMHG

## 2021-04-22 VITALS — SYSTOLIC BLOOD PRESSURE: 141 MMHG | DIASTOLIC BLOOD PRESSURE: 56 MMHG

## 2021-04-22 VITALS — SYSTOLIC BLOOD PRESSURE: 159 MMHG | DIASTOLIC BLOOD PRESSURE: 58 MMHG

## 2021-04-22 VITALS — DIASTOLIC BLOOD PRESSURE: 56 MMHG | SYSTOLIC BLOOD PRESSURE: 145 MMHG

## 2021-04-22 VITALS — DIASTOLIC BLOOD PRESSURE: 26 MMHG | SYSTOLIC BLOOD PRESSURE: 92 MMHG

## 2021-04-22 VITALS — SYSTOLIC BLOOD PRESSURE: 133 MMHG | DIASTOLIC BLOOD PRESSURE: 57 MMHG

## 2021-04-22 VITALS — SYSTOLIC BLOOD PRESSURE: 136 MMHG | DIASTOLIC BLOOD PRESSURE: 45 MMHG

## 2021-04-22 VITALS — SYSTOLIC BLOOD PRESSURE: 94 MMHG | DIASTOLIC BLOOD PRESSURE: 53 MMHG

## 2021-04-22 VITALS — SYSTOLIC BLOOD PRESSURE: 160 MMHG | DIASTOLIC BLOOD PRESSURE: 58 MMHG

## 2021-04-22 VITALS — DIASTOLIC BLOOD PRESSURE: 52 MMHG | SYSTOLIC BLOOD PRESSURE: 126 MMHG

## 2021-04-22 VITALS — DIASTOLIC BLOOD PRESSURE: 55 MMHG | SYSTOLIC BLOOD PRESSURE: 139 MMHG

## 2021-04-22 VITALS — DIASTOLIC BLOOD PRESSURE: 45 MMHG | SYSTOLIC BLOOD PRESSURE: 119 MMHG

## 2021-04-22 VITALS — DIASTOLIC BLOOD PRESSURE: 48 MMHG | SYSTOLIC BLOOD PRESSURE: 127 MMHG

## 2021-04-22 VITALS — DIASTOLIC BLOOD PRESSURE: 51 MMHG | SYSTOLIC BLOOD PRESSURE: 150 MMHG

## 2021-04-22 VITALS — SYSTOLIC BLOOD PRESSURE: 116 MMHG | DIASTOLIC BLOOD PRESSURE: 53 MMHG

## 2021-04-22 VITALS — SYSTOLIC BLOOD PRESSURE: 109 MMHG | DIASTOLIC BLOOD PRESSURE: 29 MMHG

## 2021-04-22 VITALS — DIASTOLIC BLOOD PRESSURE: 67 MMHG | SYSTOLIC BLOOD PRESSURE: 143 MMHG

## 2021-04-22 VITALS — DIASTOLIC BLOOD PRESSURE: 45 MMHG | SYSTOLIC BLOOD PRESSURE: 100 MMHG

## 2021-04-22 VITALS — DIASTOLIC BLOOD PRESSURE: 30 MMHG | SYSTOLIC BLOOD PRESSURE: 84 MMHG

## 2021-04-22 VITALS — SYSTOLIC BLOOD PRESSURE: 136 MMHG | DIASTOLIC BLOOD PRESSURE: 58 MMHG

## 2021-04-22 VITALS — DIASTOLIC BLOOD PRESSURE: 50 MMHG | SYSTOLIC BLOOD PRESSURE: 132 MMHG

## 2021-04-22 VITALS — DIASTOLIC BLOOD PRESSURE: 49 MMHG | SYSTOLIC BLOOD PRESSURE: 125 MMHG

## 2021-04-22 VITALS — DIASTOLIC BLOOD PRESSURE: 42 MMHG | SYSTOLIC BLOOD PRESSURE: 99 MMHG

## 2021-04-22 VITALS — SYSTOLIC BLOOD PRESSURE: 127 MMHG | DIASTOLIC BLOOD PRESSURE: 53 MMHG

## 2021-04-22 VITALS — SYSTOLIC BLOOD PRESSURE: 109 MMHG | DIASTOLIC BLOOD PRESSURE: 53 MMHG

## 2021-04-22 VITALS — DIASTOLIC BLOOD PRESSURE: 51 MMHG | SYSTOLIC BLOOD PRESSURE: 126 MMHG

## 2021-04-22 VITALS — DIASTOLIC BLOOD PRESSURE: 49 MMHG | SYSTOLIC BLOOD PRESSURE: 121 MMHG

## 2021-04-22 VITALS — DIASTOLIC BLOOD PRESSURE: 55 MMHG | SYSTOLIC BLOOD PRESSURE: 140 MMHG

## 2021-04-22 VITALS — DIASTOLIC BLOOD PRESSURE: 53 MMHG | SYSTOLIC BLOOD PRESSURE: 129 MMHG

## 2021-04-22 LAB
ANION GAP SERPL CALCULATED.3IONS-SCNC: 13 MMOL/L (ref 5–15)
BUN SERPL-MCNC: 76 MG/DL (ref 7–18)
BUN/CREAT SERPL: 4.4
CALCIUM SERPL-MCNC: 9.1 MG/DL (ref 8.5–10.1)
CHLORIDE SERPL-SCNC: 92 MMOL/L (ref 98–107)
CO2 SERPL-SCNC: 25 MMOL/L (ref 21–32)
GLUCOSE SERPL-MCNC: 87 MG/DL (ref 74–106)
HCT VFR BLD AUTO: 33.3 % (ref 41–53)
HGB BLD-MCNC: 11.3 G/DL (ref 13.5–17.5)
MCH RBC QN AUTO: 27 PG (ref 28–32)
MCV RBC AUTO: 79.6 FL (ref 80–100)
NRBC BLD QL AUTO: 0 %
POTASSIUM SERPL-SCNC: 6.1 MMOL/L (ref 3.5–5.1)
SODIUM SERPL-SCNC: 130 MMOL/L (ref 136–145)

## 2021-04-22 PROCEDURE — 0QBP0ZZ EXCISION OF LEFT METATARSAL, OPEN APPROACH: ICD-10-PCS | Performed by: PODIATRIST

## 2021-04-22 PROCEDURE — 0Y6W0Z0 DETACHMENT AT LEFT 4TH TOE, COMPLETE, OPEN APPROACH: ICD-10-PCS | Performed by: PODIATRIST

## 2021-04-22 PROCEDURE — 0QTP0ZZ RESECTION OF LEFT METATARSAL, OPEN APPROACH: ICD-10-PCS | Performed by: PODIATRIST

## 2021-04-22 RX ADMIN — HUMAN INSULIN SCH UNITS: 100 INJECTION, SOLUTION SUBCUTANEOUS at 21:21

## 2021-04-22 RX ADMIN — Medication SCH STRIP: at 11:30

## 2021-04-22 RX ADMIN — HUMAN INSULIN SCH UNITS: 100 INJECTION, SOLUTION SUBCUTANEOUS at 17:00

## 2021-04-22 RX ADMIN — METOPROLOL TARTRATE SCH MG: 25 TABLET, FILM COATED ORAL at 22:30

## 2021-04-22 RX ADMIN — HYDROCODONE BITARTRATE AND ACETAMINOPHEN PRN TAB: 5; 325 TABLET ORAL at 20:01

## 2021-04-22 RX ADMIN — Medication SCH STRIP: at 07:00

## 2021-04-22 RX ADMIN — ATORVASTATIN CALCIUM SCH MG: 20 TABLET, FILM COATED ORAL at 22:29

## 2021-04-22 RX ADMIN — SODIUM CHLORIDE SCH ML: 9 INJECTION INTRAMUSCULAR; INTRAVENOUS; SUBCUTANEOUS at 10:41

## 2021-04-22 RX ADMIN — SODIUM CHLORIDE SCH ML: 9 INJECTION INTRAMUSCULAR; INTRAVENOUS; SUBCUTANEOUS at 22:29

## 2021-04-22 RX ADMIN — Medication SCH STRIP: at 17:00

## 2021-04-22 RX ADMIN — HUMAN INSULIN SCH UNITS: 100 INJECTION, SOLUTION SUBCUTANEOUS at 11:30

## 2021-04-22 RX ADMIN — MORPHINE SULFATE PRN MG: 2 INJECTION, SOLUTION INTRAMUSCULAR; INTRAVENOUS at 22:37

## 2021-04-22 RX ADMIN — HUMAN INSULIN SCH UNITS: 100 INJECTION, SOLUTION SUBCUTANEOUS at 07:00

## 2021-04-22 RX ADMIN — MORPHINE SULFATE PRN MG: 2 INJECTION, SOLUTION INTRAMUSCULAR; INTRAVENOUS at 16:40

## 2021-04-22 RX ADMIN — Medication SCH STRIP: at 21:21

## 2021-04-23 VITALS — SYSTOLIC BLOOD PRESSURE: 138 MMHG | DIASTOLIC BLOOD PRESSURE: 67 MMHG

## 2021-04-23 VITALS — DIASTOLIC BLOOD PRESSURE: 62 MMHG | SYSTOLIC BLOOD PRESSURE: 155 MMHG

## 2021-04-23 VITALS — DIASTOLIC BLOOD PRESSURE: 71 MMHG | SYSTOLIC BLOOD PRESSURE: 137 MMHG

## 2021-04-23 VITALS — SYSTOLIC BLOOD PRESSURE: 130 MMHG | DIASTOLIC BLOOD PRESSURE: 60 MMHG

## 2021-04-23 VITALS — DIASTOLIC BLOOD PRESSURE: 76 MMHG | SYSTOLIC BLOOD PRESSURE: 158 MMHG

## 2021-04-23 LAB
ANION GAP SERPL CALCULATED.3IONS-SCNC: 8 MMOL/L (ref 5–15)
BUN SERPL-MCNC: 48 MG/DL (ref 7–18)
BUN/CREAT SERPL: 3.6
CALCIUM SERPL-MCNC: 9 MG/DL (ref 8.5–10.1)
CHLORIDE SERPL-SCNC: 93 MMOL/L (ref 98–107)
CO2 SERPL-SCNC: 29 MMOL/L (ref 21–32)
GLUCOSE SERPL-MCNC: 120 MG/DL (ref 74–106)
HCT VFR BLD AUTO: 31.6 % (ref 41–53)
HGB BLD-MCNC: 10.3 G/DL (ref 13.5–17.5)
MCH RBC QN AUTO: 25.9 PG (ref 28–32)
MCV RBC AUTO: 79.2 FL (ref 80–100)
NRBC BLD QL AUTO: 0 %
POTASSIUM SERPL-SCNC: 5.1 MMOL/L (ref 3.5–5.1)
SODIUM SERPL-SCNC: 130 MMOL/L (ref 136–145)

## 2021-04-23 RX ADMIN — Medication SCH STRIP: at 06:23

## 2021-04-23 RX ADMIN — Medication SCH STRIP: at 17:32

## 2021-04-23 RX ADMIN — HYDROMORPHONE HYDROCHLORIDE PRN MG: 2 INJECTION, SOLUTION INTRAMUSCULAR; INTRAVENOUS; SUBCUTANEOUS at 12:55

## 2021-04-23 RX ADMIN — MORPHINE SULFATE PRN MG: 2 INJECTION, SOLUTION INTRAMUSCULAR; INTRAVENOUS at 02:37

## 2021-04-23 RX ADMIN — Medication SCH STRIP: at 22:03

## 2021-04-23 RX ADMIN — FAMOTIDINE SCH MG: 20 TABLET, FILM COATED ORAL at 10:31

## 2021-04-23 RX ADMIN — APIXABAN SCH MG: 2.5 TABLET, FILM COATED ORAL at 21:46

## 2021-04-23 RX ADMIN — HUMAN INSULIN SCH UNITS: 100 INJECTION, SOLUTION SUBCUTANEOUS at 06:23

## 2021-04-23 RX ADMIN — Medication SCH STRIP: at 12:39

## 2021-04-23 RX ADMIN — CEFAZOLIN SODIUM SCH MLS/HR: 1 INJECTION, SOLUTION INTRAVENOUS at 14:29

## 2021-04-23 RX ADMIN — APIXABAN SCH MG: 2.5 TABLET, FILM COATED ORAL at 10:31

## 2021-04-23 RX ADMIN — HUMAN INSULIN SCH UNITS: 100 INJECTION, SOLUTION SUBCUTANEOUS at 11:30

## 2021-04-23 RX ADMIN — SODIUM CHLORIDE SCH ML: 9 INJECTION INTRAMUSCULAR; INTRAVENOUS; SUBCUTANEOUS at 22:03

## 2021-04-23 RX ADMIN — SODIUM CHLORIDE SCH ML: 9 INJECTION INTRAMUSCULAR; INTRAVENOUS; SUBCUTANEOUS at 10:30

## 2021-04-23 RX ADMIN — HYDROMORPHONE HYDROCHLORIDE PRN MG: 2 INJECTION, SOLUTION INTRAMUSCULAR; INTRAVENOUS; SUBCUTANEOUS at 20:13

## 2021-04-23 RX ADMIN — CEFAZOLIN SODIUM SCH MLS/HR: 1 INJECTION, SOLUTION INTRAVENOUS at 21:45

## 2021-04-23 RX ADMIN — HUMAN INSULIN SCH UNITS: 100 INJECTION, SOLUTION SUBCUTANEOUS at 17:00

## 2021-04-23 RX ADMIN — METOPROLOL TARTRATE SCH MG: 25 TABLET, FILM COATED ORAL at 21:48

## 2021-04-23 RX ADMIN — METOPROLOL TARTRATE SCH MG: 25 TABLET, FILM COATED ORAL at 10:32

## 2021-04-23 RX ADMIN — HUMAN INSULIN SCH UNITS: 100 INJECTION, SOLUTION SUBCUTANEOUS at 22:00

## 2021-04-23 RX ADMIN — MORPHINE SULFATE PRN MG: 2 INJECTION, SOLUTION INTRAMUSCULAR; INTRAVENOUS at 06:36

## 2021-04-23 RX ADMIN — ATORVASTATIN CALCIUM SCH MG: 20 TABLET, FILM COATED ORAL at 21:46

## 2021-04-24 VITALS — DIASTOLIC BLOOD PRESSURE: 58 MMHG | SYSTOLIC BLOOD PRESSURE: 119 MMHG

## 2021-04-24 VITALS — SYSTOLIC BLOOD PRESSURE: 143 MMHG | DIASTOLIC BLOOD PRESSURE: 68 MMHG

## 2021-04-24 VITALS — SYSTOLIC BLOOD PRESSURE: 155 MMHG | DIASTOLIC BLOOD PRESSURE: 72 MMHG

## 2021-04-24 VITALS — SYSTOLIC BLOOD PRESSURE: 132 MMHG | DIASTOLIC BLOOD PRESSURE: 60 MMHG

## 2021-04-24 VITALS — DIASTOLIC BLOOD PRESSURE: 75 MMHG | SYSTOLIC BLOOD PRESSURE: 157 MMHG

## 2021-04-24 LAB
ANION GAP SERPL CALCULATED.3IONS-SCNC: 11 MMOL/L (ref 5–15)
BUN SERPL-MCNC: 54 MG/DL (ref 7–18)
BUN/CREAT SERPL: 3.5
CALCIUM SERPL-MCNC: 8.9 MG/DL (ref 8.5–10.1)
CHLORIDE SERPL-SCNC: 91 MMOL/L (ref 98–107)
CO2 SERPL-SCNC: 27 MMOL/L (ref 21–32)
GLUCOSE SERPL-MCNC: 78 MG/DL (ref 74–106)
HCT VFR BLD AUTO: 34.6 % (ref 41–53)
HGB BLD-MCNC: 11.4 G/DL (ref 13.5–17.5)
MCH RBC QN AUTO: 26 PG (ref 28–32)
MCV RBC AUTO: 79.1 FL (ref 80–100)
NRBC BLD QL AUTO: 0 %
POTASSIUM SERPL-SCNC: 5.1 MMOL/L (ref 3.5–5.1)
SODIUM SERPL-SCNC: 129 MMOL/L (ref 136–145)

## 2021-04-24 PROCEDURE — 5A1D70Z PERFORMANCE OF URINARY FILTRATION, INTERMITTENT, LESS THAN 6 HOURS PER DAY: ICD-10-PCS | Performed by: INTERNAL MEDICINE

## 2021-04-24 RX ADMIN — APIXABAN SCH MG: 2.5 TABLET, FILM COATED ORAL at 12:13

## 2021-04-24 RX ADMIN — CEFAZOLIN SODIUM SCH MLS/HR: 1 INJECTION, SOLUTION INTRAVENOUS at 06:13

## 2021-04-24 RX ADMIN — CEFAZOLIN SODIUM SCH MLS/HR: 1 INJECTION, SOLUTION INTRAVENOUS at 14:59

## 2021-04-24 RX ADMIN — CEFAZOLIN SODIUM SCH MLS/HR: 1 INJECTION, SOLUTION INTRAVENOUS at 22:03

## 2021-04-24 RX ADMIN — Medication SCH STRIP: at 12:14

## 2021-04-24 RX ADMIN — METOPROLOL TARTRATE SCH MG: 25 TABLET, FILM COATED ORAL at 22:04

## 2021-04-24 RX ADMIN — SODIUM CHLORIDE SCH ML: 9 INJECTION INTRAMUSCULAR; INTRAVENOUS; SUBCUTANEOUS at 22:00

## 2021-04-24 RX ADMIN — ATORVASTATIN CALCIUM SCH MG: 20 TABLET, FILM COATED ORAL at 22:13

## 2021-04-24 RX ADMIN — HUMAN INSULIN SCH UNITS: 100 INJECTION, SOLUTION SUBCUTANEOUS at 06:15

## 2021-04-24 RX ADMIN — Medication SCH STRIP: at 16:51

## 2021-04-24 RX ADMIN — Medication SCH STRIP: at 07:28

## 2021-04-24 RX ADMIN — HYDROMORPHONE HYDROCHLORIDE PRN MG: 2 INJECTION, SOLUTION INTRAMUSCULAR; INTRAVENOUS; SUBCUTANEOUS at 15:24

## 2021-04-24 RX ADMIN — HUMAN INSULIN SCH UNITS: 100 INJECTION, SOLUTION SUBCUTANEOUS at 16:52

## 2021-04-24 RX ADMIN — HYDROMORPHONE HYDROCHLORIDE PRN MG: 2 INJECTION, SOLUTION INTRAMUSCULAR; INTRAVENOUS; SUBCUTANEOUS at 22:04

## 2021-04-24 RX ADMIN — SODIUM CHLORIDE SCH ML: 9 INJECTION INTRAMUSCULAR; INTRAVENOUS; SUBCUTANEOUS at 12:13

## 2021-04-24 RX ADMIN — METOPROLOL TARTRATE SCH MG: 25 TABLET, FILM COATED ORAL at 12:14

## 2021-04-24 RX ADMIN — HUMAN INSULIN SCH UNITS: 100 INJECTION, SOLUTION SUBCUTANEOUS at 22:00

## 2021-04-24 RX ADMIN — APIXABAN SCH MG: 2.5 TABLET, FILM COATED ORAL at 22:03

## 2021-04-24 RX ADMIN — Medication SCH STRIP: at 22:00

## 2021-04-24 RX ADMIN — HUMAN INSULIN SCH UNITS: 100 INJECTION, SOLUTION SUBCUTANEOUS at 11:30

## 2021-04-25 VITALS — DIASTOLIC BLOOD PRESSURE: 75 MMHG | SYSTOLIC BLOOD PRESSURE: 158 MMHG

## 2021-04-25 VITALS — SYSTOLIC BLOOD PRESSURE: 160 MMHG | DIASTOLIC BLOOD PRESSURE: 78 MMHG

## 2021-04-25 VITALS — SYSTOLIC BLOOD PRESSURE: 158 MMHG | DIASTOLIC BLOOD PRESSURE: 93 MMHG

## 2021-04-25 VITALS — DIASTOLIC BLOOD PRESSURE: 76 MMHG | SYSTOLIC BLOOD PRESSURE: 149 MMHG

## 2021-04-25 VITALS — DIASTOLIC BLOOD PRESSURE: 86 MMHG | SYSTOLIC BLOOD PRESSURE: 166 MMHG

## 2021-04-25 VITALS — DIASTOLIC BLOOD PRESSURE: 78 MMHG | SYSTOLIC BLOOD PRESSURE: 160 MMHG

## 2021-04-25 RX ADMIN — SODIUM CHLORIDE SCH ML: 9 INJECTION INTRAMUSCULAR; INTRAVENOUS; SUBCUTANEOUS at 09:30

## 2021-04-25 RX ADMIN — HUMAN INSULIN SCH UNITS: 100 INJECTION, SOLUTION SUBCUTANEOUS at 11:30

## 2021-04-25 RX ADMIN — METOPROLOL TARTRATE SCH MG: 25 TABLET, FILM COATED ORAL at 22:05

## 2021-04-25 RX ADMIN — LABETALOL HYDROCHLORIDE PRN MG: 5 INJECTION, SOLUTION INTRAVENOUS at 23:24

## 2021-04-25 RX ADMIN — CEFAZOLIN SODIUM SCH MLS/HR: 1 INJECTION, SOLUTION INTRAVENOUS at 21:49

## 2021-04-25 RX ADMIN — Medication SCH STRIP: at 22:05

## 2021-04-25 RX ADMIN — HYDROCODONE BITARTRATE AND ACETAMINOPHEN PRN TAB: 5; 325 TABLET ORAL at 19:57

## 2021-04-25 RX ADMIN — Medication SCH STRIP: at 05:59

## 2021-04-25 RX ADMIN — Medication PRN ML: at 14:52

## 2021-04-25 RX ADMIN — METOPROLOL TARTRATE SCH MG: 25 TABLET, FILM COATED ORAL at 09:31

## 2021-04-25 RX ADMIN — APIXABAN SCH MG: 2.5 TABLET, FILM COATED ORAL at 21:49

## 2021-04-25 RX ADMIN — ATORVASTATIN CALCIUM SCH MG: 20 TABLET, FILM COATED ORAL at 22:05

## 2021-04-25 RX ADMIN — Medication SCH STRIP: at 17:33

## 2021-04-25 RX ADMIN — HUMAN INSULIN SCH UNITS: 100 INJECTION, SOLUTION SUBCUTANEOUS at 22:00

## 2021-04-25 RX ADMIN — CEFAZOLIN SODIUM SCH MLS/HR: 1 INJECTION, SOLUTION INTRAVENOUS at 14:51

## 2021-04-25 RX ADMIN — Medication SCH STRIP: at 11:48

## 2021-04-25 RX ADMIN — CEFAZOLIN SODIUM SCH MLS/HR: 1 INJECTION, SOLUTION INTRAVENOUS at 05:53

## 2021-04-25 RX ADMIN — HUMAN INSULIN SCH UNITS: 100 INJECTION, SOLUTION SUBCUTANEOUS at 05:59

## 2021-04-25 RX ADMIN — SODIUM CHLORIDE SCH ML: 9 INJECTION INTRAMUSCULAR; INTRAVENOUS; SUBCUTANEOUS at 22:06

## 2021-04-25 RX ADMIN — APIXABAN SCH MG: 2.5 TABLET, FILM COATED ORAL at 09:31

## 2021-04-25 RX ADMIN — DOCUSATE SODIUM SCH MG: 100 CAPSULE, LIQUID FILLED ORAL at 21:49

## 2021-04-25 RX ADMIN — HUMAN INSULIN SCH UNITS: 100 INJECTION, SOLUTION SUBCUTANEOUS at 17:00

## 2021-04-25 RX ADMIN — FAMOTIDINE SCH MG: 20 TABLET, FILM COATED ORAL at 09:32

## 2021-04-25 RX ADMIN — HYDROMORPHONE HYDROCHLORIDE PRN MG: 2 INJECTION, SOLUTION INTRAMUSCULAR; INTRAVENOUS; SUBCUTANEOUS at 14:53

## 2021-04-26 VITALS — SYSTOLIC BLOOD PRESSURE: 161 MMHG | DIASTOLIC BLOOD PRESSURE: 80 MMHG

## 2021-04-26 VITALS — SYSTOLIC BLOOD PRESSURE: 164 MMHG | DIASTOLIC BLOOD PRESSURE: 86 MMHG

## 2021-04-26 VITALS — SYSTOLIC BLOOD PRESSURE: 175 MMHG | DIASTOLIC BLOOD PRESSURE: 69 MMHG

## 2021-04-26 VITALS — SYSTOLIC BLOOD PRESSURE: 139 MMHG | DIASTOLIC BLOOD PRESSURE: 75 MMHG

## 2021-04-26 VITALS — SYSTOLIC BLOOD PRESSURE: 127 MMHG | DIASTOLIC BLOOD PRESSURE: 58 MMHG

## 2021-04-26 PROCEDURE — 5A1D70Z PERFORMANCE OF URINARY FILTRATION, INTERMITTENT, LESS THAN 6 HOURS PER DAY: ICD-10-PCS | Performed by: INTERNAL MEDICINE

## 2021-04-26 RX ADMIN — CEFAZOLIN SODIUM SCH MLS/HR: 1 INJECTION, SOLUTION INTRAVENOUS at 14:20

## 2021-04-26 RX ADMIN — DOCUSATE SODIUM SCH MG: 100 CAPSULE, LIQUID FILLED ORAL at 09:08

## 2021-04-26 RX ADMIN — ATORVASTATIN CALCIUM SCH MG: 20 TABLET, FILM COATED ORAL at 21:56

## 2021-04-26 RX ADMIN — Medication SCH STRIP: at 19:13

## 2021-04-26 RX ADMIN — HUMAN INSULIN SCH UNITS: 100 INJECTION, SOLUTION SUBCUTANEOUS at 21:58

## 2021-04-26 RX ADMIN — Medication SCH STRIP: at 21:58

## 2021-04-26 RX ADMIN — METOPROLOL TARTRATE SCH MG: 25 TABLET, FILM COATED ORAL at 21:56

## 2021-04-26 RX ADMIN — Medication SCH STRIP: at 06:29

## 2021-04-26 RX ADMIN — Medication PRN ML: at 09:12

## 2021-04-26 RX ADMIN — SODIUM CHLORIDE SCH ML: 9 INJECTION INTRAMUSCULAR; INTRAVENOUS; SUBCUTANEOUS at 21:58

## 2021-04-26 RX ADMIN — DOCUSATE SODIUM SCH MG: 100 CAPSULE, LIQUID FILLED ORAL at 21:56

## 2021-04-26 RX ADMIN — APIXABAN SCH MG: 2.5 TABLET, FILM COATED ORAL at 09:10

## 2021-04-26 RX ADMIN — SODIUM CHLORIDE SCH ML: 9 INJECTION INTRAMUSCULAR; INTRAVENOUS; SUBCUTANEOUS at 12:01

## 2021-04-26 RX ADMIN — METOPROLOL TARTRATE SCH MG: 25 TABLET, FILM COATED ORAL at 09:09

## 2021-04-26 RX ADMIN — APIXABAN SCH MG: 2.5 TABLET, FILM COATED ORAL at 21:56

## 2021-04-26 RX ADMIN — Medication SCH STRIP: at 12:02

## 2021-04-26 RX ADMIN — HUMAN INSULIN SCH UNITS: 100 INJECTION, SOLUTION SUBCUTANEOUS at 11:30

## 2021-04-26 RX ADMIN — HUMAN INSULIN SCH UNITS: 100 INJECTION, SOLUTION SUBCUTANEOUS at 06:28

## 2021-04-26 RX ADMIN — HUMAN INSULIN SCH UNITS: 100 INJECTION, SOLUTION SUBCUTANEOUS at 17:00

## 2021-04-26 RX ADMIN — CEFAZOLIN SODIUM SCH MLS/HR: 1 INJECTION, SOLUTION INTRAVENOUS at 05:59

## 2021-04-27 VITALS — DIASTOLIC BLOOD PRESSURE: 75 MMHG | SYSTOLIC BLOOD PRESSURE: 164 MMHG

## 2021-04-27 VITALS — DIASTOLIC BLOOD PRESSURE: 62 MMHG | SYSTOLIC BLOOD PRESSURE: 136 MMHG

## 2021-04-27 VITALS — SYSTOLIC BLOOD PRESSURE: 136 MMHG | DIASTOLIC BLOOD PRESSURE: 62 MMHG

## 2021-04-27 RX ADMIN — APIXABAN SCH MG: 2.5 TABLET, FILM COATED ORAL at 08:47

## 2021-04-27 RX ADMIN — SODIUM CHLORIDE SCH ML: 9 INJECTION INTRAMUSCULAR; INTRAVENOUS; SUBCUTANEOUS at 10:25

## 2021-04-27 RX ADMIN — Medication SCH STRIP: at 06:32

## 2021-04-27 RX ADMIN — METOPROLOL TARTRATE SCH MG: 25 TABLET, FILM COATED ORAL at 08:48

## 2021-04-27 RX ADMIN — HUMAN INSULIN SCH UNITS: 100 INJECTION, SOLUTION SUBCUTANEOUS at 06:32

## 2021-04-27 RX ADMIN — DOCUSATE SODIUM SCH MG: 100 CAPSULE, LIQUID FILLED ORAL at 08:48

## 2021-04-27 RX ADMIN — FAMOTIDINE SCH MG: 20 TABLET, FILM COATED ORAL at 08:49

## 2021-05-19 ENCOUNTER — HOSPITAL ENCOUNTER (EMERGENCY)
Dept: HOSPITAL 15 - ER | Age: 51
Discharge: HOME | End: 2021-05-19
Payer: MEDICARE

## 2021-05-19 VITALS — HEIGHT: 74 IN | WEIGHT: 245 LBS | BODY MASS INDEX: 31.44 KG/M2

## 2021-05-19 VITALS — DIASTOLIC BLOOD PRESSURE: 81 MMHG | SYSTOLIC BLOOD PRESSURE: 99 MMHG
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
## 2021-05-19 DIAGNOSIS — T80.211A: Primary | ICD-10-CM

## 2021-05-19 LAB
APTT PPP: 30.8 SEC (ref 23–31.2)
HCT VFR BLD AUTO: 31 % (ref 41–53)
HGB BLD-MCNC: 10.2 G/DL (ref 13.5–17.5)
INR PPP: 1.04 (ref 0.9–1.15)
MCH RBC QN AUTO: 26.1 PG (ref 28–32)
MCV RBC AUTO: 79.5 FL (ref 80–100)
NRBC BLD QL AUTO: 0.1 %

## 2021-05-19 PROCEDURE — 36415 COLL VENOUS BLD VENIPUNCTURE: CPT

## 2021-05-19 PROCEDURE — 36569 INSJ PICC 5 YR+ W/O IMAGING: CPT

## 2021-05-19 PROCEDURE — 71045 X-RAY EXAM CHEST 1 VIEW: CPT

## 2021-05-19 PROCEDURE — 85610 PROTHROMBIN TIME: CPT

## 2021-05-19 PROCEDURE — 99285 EMERGENCY DEPT VISIT HI MDM: CPT

## 2021-05-19 PROCEDURE — 85730 THROMBOPLASTIN TIME PARTIAL: CPT

## 2021-05-19 PROCEDURE — 71046 X-RAY EXAM CHEST 2 VIEWS: CPT

## 2021-05-19 PROCEDURE — 85025 COMPLETE CBC W/AUTO DIFF WBC: CPT

## 2021-05-19 PROCEDURE — 93005 ELECTROCARDIOGRAM TRACING: CPT

## 2021-06-15 ENCOUNTER — HOSPITAL ENCOUNTER (INPATIENT)
Dept: HOSPITAL 15 - ER | Age: 51
LOS: 2 days | Discharge: HOME HEALTH | DRG: 573 | End: 2021-06-17
Attending: INTERNAL MEDICINE | Admitting: NURSE PRACTITIONER
Payer: MEDICARE

## 2021-06-15 VITALS — HEIGHT: 72 IN | BODY MASS INDEX: 33.8 KG/M2 | WEIGHT: 249.56 LBS

## 2021-06-15 DIAGNOSIS — N18.6: ICD-10-CM

## 2021-06-15 DIAGNOSIS — L03.116: Primary | ICD-10-CM

## 2021-06-15 DIAGNOSIS — Z99.2: ICD-10-CM

## 2021-06-15 DIAGNOSIS — E11.22: ICD-10-CM

## 2021-06-15 DIAGNOSIS — Z20.822: ICD-10-CM

## 2021-06-15 DIAGNOSIS — L97.529: ICD-10-CM

## 2021-06-15 DIAGNOSIS — E87.5: ICD-10-CM

## 2021-06-15 DIAGNOSIS — E11.51: ICD-10-CM

## 2021-06-15 DIAGNOSIS — E11.621: ICD-10-CM

## 2021-06-15 DIAGNOSIS — E11.69: ICD-10-CM

## 2021-06-15 DIAGNOSIS — E66.01: ICD-10-CM

## 2021-06-15 DIAGNOSIS — I12.0: ICD-10-CM

## 2021-06-15 DIAGNOSIS — Z83.3: ICD-10-CM

## 2021-06-15 DIAGNOSIS — E78.5: ICD-10-CM

## 2021-06-15 DIAGNOSIS — M86.8X7: ICD-10-CM

## 2021-06-15 PROCEDURE — 96365 THER/PROPH/DIAG IV INF INIT: CPT

## 2021-06-15 PROCEDURE — 87081 CULTURE SCREEN ONLY: CPT

## 2021-06-15 PROCEDURE — 36415 COLL VENOUS BLD VENIPUNCTURE: CPT

## 2021-06-15 PROCEDURE — 85730 THROMBOPLASTIN TIME PARTIAL: CPT

## 2021-06-15 PROCEDURE — 80053 COMPREHEN METABOLIC PANEL: CPT

## 2021-06-15 PROCEDURE — 93005 ELECTROCARDIOGRAM TRACING: CPT

## 2021-06-15 PROCEDURE — 96375 TX/PRO/DX INJ NEW DRUG ADDON: CPT

## 2021-06-15 PROCEDURE — 80048 BASIC METABOLIC PNL TOTAL CA: CPT

## 2021-06-15 PROCEDURE — 82962 GLUCOSE BLOOD TEST: CPT

## 2021-06-15 PROCEDURE — 85025 COMPLETE CBC W/AUTO DIFF WBC: CPT

## 2021-06-15 PROCEDURE — 85610 PROTHROMBIN TIME: CPT

## 2021-06-15 PROCEDURE — 87426 SARSCOV CORONAVIRUS AG IA: CPT

## 2021-06-15 PROCEDURE — 90935 HEMODIALYSIS ONE EVALUATION: CPT

## 2021-06-15 PROCEDURE — 71045 X-RAY EXAM CHEST 1 VIEW: CPT

## 2021-06-16 VITALS — SYSTOLIC BLOOD PRESSURE: 157 MMHG | DIASTOLIC BLOOD PRESSURE: 80 MMHG

## 2021-06-16 LAB
ALBUMIN SERPL-MCNC: 3.5 G/DL (ref 3.4–5)
ALP SERPL-CCNC: 116 U/L (ref 45–117)
ALT SERPL-CCNC: 12 U/L (ref 16–61)
ANION GAP SERPL CALCULATED.3IONS-SCNC: 10 MMOL/L (ref 5–15)
APTT PPP: 28.9 SEC (ref 23–31.2)
BILIRUB SERPL-MCNC: 0.4 MG/DL (ref 0.2–1)
BUN SERPL-MCNC: 54 MG/DL (ref 7–18)
BUN/CREAT SERPL: 4.9
CALCIUM SERPL-MCNC: 9.8 MG/DL (ref 8.5–10.1)
CHLORIDE SERPL-SCNC: 99 MMOL/L (ref 98–107)
CO2 SERPL-SCNC: 24 MMOL/L (ref 21–32)
GLUCOSE SERPL-MCNC: 84 MG/DL (ref 74–106)
HCT VFR BLD AUTO: 33.7 % (ref 41–53)
HGB BLD-MCNC: 10.8 G/DL (ref 13.5–17.5)
INR PPP: 1 (ref 0.9–1.15)
MCH RBC QN AUTO: 26.3 PG (ref 28–32)
MCV RBC AUTO: 81.8 FL (ref 80–100)
NRBC BLD QL AUTO: 0 %
POTASSIUM SERPL-SCNC: 5.9 MMOL/L (ref 3.5–5.1)
PROT SERPL-MCNC: 9.2 G/DL (ref 6.4–8.2)
SODIUM SERPL-SCNC: 133 MMOL/L (ref 136–145)

## 2021-06-16 PROCEDURE — 0HRNXK4 REPLACEMENT OF LEFT FOOT SKIN WITH NONAUTOLOGOUS TISSUE SUBSTITUTE, PARTIAL THICKNESS, EXTERNAL APPROACH: ICD-10-PCS | Performed by: PODIATRIST

## 2021-06-16 PROCEDURE — 0HBNXZZ EXCISION OF LEFT FOOT SKIN, EXTERNAL APPROACH: ICD-10-PCS | Performed by: PODIATRIST

## 2021-06-16 PROCEDURE — 5A1D70Z PERFORMANCE OF URINARY FILTRATION, INTERMITTENT, LESS THAN 6 HOURS PER DAY: ICD-10-PCS | Performed by: INTERNAL MEDICINE

## 2021-06-16 RX ADMIN — CLINDAMYCIN PHOSPHATE SCH MLS/HR: 150 INJECTION, SOLUTION INTRAVENOUS at 14:19

## 2021-06-16 RX ADMIN — PANTOPRAZOLE SODIUM SCH MG: 40 INJECTION, POWDER, FOR SOLUTION INTRAVENOUS at 10:00

## 2021-06-16 RX ADMIN — HYDRALAZINE HYDROCHLORIDE PRN MG: 20 INJECTION INTRAMUSCULAR; INTRAVENOUS at 15:47

## 2021-06-16 RX ADMIN — CLINDAMYCIN PHOSPHATE SCH MLS/HR: 150 INJECTION, SOLUTION INTRAVENOUS at 22:00

## 2021-06-16 RX ADMIN — CLINDAMYCIN PHOSPHATE SCH MLS/HR: 150 INJECTION, SOLUTION INTRAVENOUS at 08:30

## 2021-06-17 VITALS — DIASTOLIC BLOOD PRESSURE: 70 MMHG | SYSTOLIC BLOOD PRESSURE: 120 MMHG

## 2021-06-17 VITALS — SYSTOLIC BLOOD PRESSURE: 177 MMHG | DIASTOLIC BLOOD PRESSURE: 78 MMHG

## 2021-06-17 VITALS — SYSTOLIC BLOOD PRESSURE: 163 MMHG | DIASTOLIC BLOOD PRESSURE: 80 MMHG

## 2021-06-17 VITALS — DIASTOLIC BLOOD PRESSURE: 97 MMHG | SYSTOLIC BLOOD PRESSURE: 170 MMHG

## 2021-06-17 VITALS — DIASTOLIC BLOOD PRESSURE: 80 MMHG | SYSTOLIC BLOOD PRESSURE: 163 MMHG

## 2021-06-17 LAB
ANION GAP SERPL CALCULATED.3IONS-SCNC: 7 MMOL/L (ref 5–15)
ANION GAP SERPL CALCULATED.3IONS-SCNC: 9 MMOL/L (ref 5–15)
BUN SERPL-MCNC: 45 MG/DL (ref 7–18)
BUN SERPL-MCNC: 49 MG/DL (ref 7–18)
BUN/CREAT SERPL: 4.5
BUN/CREAT SERPL: 4.8
CALCIUM SERPL-MCNC: 9.2 MG/DL (ref 8.5–10.1)
CALCIUM SERPL-MCNC: 9.6 MG/DL (ref 8.5–10.1)
CHLORIDE SERPL-SCNC: 100 MMOL/L (ref 98–107)
CHLORIDE SERPL-SCNC: 99 MMOL/L (ref 98–107)
CO2 SERPL-SCNC: 26 MMOL/L (ref 21–32)
CO2 SERPL-SCNC: 26 MMOL/L (ref 21–32)
GLUCOSE SERPL-MCNC: 76 MG/DL (ref 74–106)
GLUCOSE SERPL-MCNC: 97 MG/DL (ref 74–106)
HCT VFR BLD AUTO: 34.3 % (ref 41–53)
HGB BLD-MCNC: 11.2 G/DL (ref 13.5–17.5)
MCH RBC QN AUTO: 26.2 PG (ref 28–32)
MCV RBC AUTO: 80.2 FL (ref 80–100)
NRBC BLD QL AUTO: 0 %
POTASSIUM SERPL-SCNC: 5.5 MMOL/L (ref 3.5–5.1)
POTASSIUM SERPL-SCNC: 6.4 MMOL/L (ref 3.5–5.1)
SODIUM SERPL-SCNC: 133 MMOL/L (ref 136–145)
SODIUM SERPL-SCNC: 134 MMOL/L (ref 136–145)

## 2021-06-17 RX ADMIN — PANTOPRAZOLE SODIUM SCH MG: 40 INJECTION, POWDER, FOR SOLUTION INTRAVENOUS at 11:03

## 2021-06-17 RX ADMIN — CLINDAMYCIN PHOSPHATE SCH MLS/HR: 150 INJECTION, SOLUTION INTRAVENOUS at 06:00

## 2021-06-17 RX ADMIN — HYDRALAZINE HYDROCHLORIDE PRN MG: 20 INJECTION INTRAMUSCULAR; INTRAVENOUS at 08:10

## 2021-06-17 RX ADMIN — CLINDAMYCIN PHOSPHATE SCH MLS/HR: 150 INJECTION, SOLUTION INTRAVENOUS at 06:34

## 2021-06-17 RX ADMIN — CLINDAMYCIN PHOSPHATE SCH MLS/HR: 150 INJECTION, SOLUTION INTRAVENOUS at 13:38

## 2021-08-29 ENCOUNTER — HOSPITAL ENCOUNTER (INPATIENT)
Dept: HOSPITAL 15 - ER | Age: 51
LOS: 4 days | Discharge: HOME HEALTH | DRG: 871 | End: 2021-09-02
Attending: NURSE PRACTITIONER | Admitting: NURSE PRACTITIONER
Payer: MEDICARE

## 2021-08-29 VITALS — WEIGHT: 216.27 LBS | HEIGHT: 72 IN | BODY MASS INDEX: 29.29 KG/M2

## 2021-08-29 DIAGNOSIS — J12.82: ICD-10-CM

## 2021-08-29 DIAGNOSIS — E87.5: ICD-10-CM

## 2021-08-29 DIAGNOSIS — U07.1: ICD-10-CM

## 2021-08-29 DIAGNOSIS — E83.39: ICD-10-CM

## 2021-08-29 DIAGNOSIS — D63.1: ICD-10-CM

## 2021-08-29 DIAGNOSIS — Z83.3: ICD-10-CM

## 2021-08-29 DIAGNOSIS — A41.89: Primary | ICD-10-CM

## 2021-08-29 DIAGNOSIS — Z91.15: ICD-10-CM

## 2021-08-29 DIAGNOSIS — R79.89: ICD-10-CM

## 2021-08-29 DIAGNOSIS — N18.6: ICD-10-CM

## 2021-08-29 DIAGNOSIS — E11.22: ICD-10-CM

## 2021-08-29 DIAGNOSIS — E66.9: ICD-10-CM

## 2021-08-29 DIAGNOSIS — Z99.2: ICD-10-CM

## 2021-08-29 LAB
ALBUMIN SERPL-MCNC: 3.1 G/DL (ref 3.4–5)
ALP SERPL-CCNC: 156 U/L (ref 45–117)
ALT SERPL-CCNC: 28 U/L (ref 16–61)
ANION GAP SERPL CALCULATED.3IONS-SCNC: 18 MMOL/L (ref 5–15)
BILIRUB SERPL-MCNC: 0.6 MG/DL (ref 0.2–1)
BUN SERPL-MCNC: 79 MG/DL (ref 7–18)
BUN/CREAT SERPL: 4.1
CALCIUM SERPL-MCNC: 7.5 MG/DL (ref 8.5–10.1)
CHLORIDE SERPL-SCNC: 92 MMOL/L (ref 98–107)
CO2 SERPL-SCNC: 17 MMOL/L (ref 21–32)
GLUCOSE SERPL-MCNC: 63 MG/DL (ref 74–106)
HCT VFR BLD AUTO: 42.6 % (ref 41–53)
HGB BLD-MCNC: 13.9 G/DL (ref 13.5–17.5)
MCH RBC QN AUTO: 25.5 PG (ref 28–32)
MCV RBC AUTO: 78.2 FL (ref 80–100)
POTASSIUM SERPL-SCNC: 6.1 MMOL/L (ref 3.5–5.1)
PROT SERPL-MCNC: 8.7 G/DL (ref 6.4–8.2)
SODIUM SERPL-SCNC: 127 MMOL/L (ref 136–145)

## 2021-08-29 PROCEDURE — 87040 BLOOD CULTURE FOR BACTERIA: CPT

## 2021-08-29 PROCEDURE — 93005 ELECTROCARDIOGRAM TRACING: CPT

## 2021-08-29 PROCEDURE — 36415 COLL VENOUS BLD VENIPUNCTURE: CPT

## 2021-08-29 PROCEDURE — 87426 SARSCOV CORONAVIRUS AG IA: CPT

## 2021-08-29 PROCEDURE — 84484 ASSAY OF TROPONIN QUANT: CPT

## 2021-08-29 PROCEDURE — 82962 GLUCOSE BLOOD TEST: CPT

## 2021-08-29 PROCEDURE — 85027 COMPLETE CBC AUTOMATED: CPT

## 2021-08-29 PROCEDURE — 83880 ASSAY OF NATRIURETIC PEPTIDE: CPT

## 2021-08-29 PROCEDURE — 87340 HEPATITIS B SURFACE AG IA: CPT

## 2021-08-29 PROCEDURE — 85025 COMPLETE CBC W/AUTO DIFF WBC: CPT

## 2021-08-29 PROCEDURE — 94640 AIRWAY INHALATION TREATMENT: CPT

## 2021-08-29 PROCEDURE — 71045 X-RAY EXAM CHEST 1 VIEW: CPT

## 2021-08-29 PROCEDURE — 96365 THER/PROPH/DIAG IV INF INIT: CPT

## 2021-08-29 PROCEDURE — 86141 C-REACTIVE PROTEIN HS: CPT

## 2021-08-29 PROCEDURE — 90935 HEMODIALYSIS ONE EVALUATION: CPT

## 2021-08-29 PROCEDURE — 85379 FIBRIN DEGRADATION QUANT: CPT

## 2021-08-29 PROCEDURE — 83605 ASSAY OF LACTIC ACID: CPT

## 2021-08-29 PROCEDURE — 85007 BL SMEAR W/DIFF WBC COUNT: CPT

## 2021-08-29 PROCEDURE — 96375 TX/PRO/DX INJ NEW DRUG ADDON: CPT

## 2021-08-29 PROCEDURE — 80053 COMPREHEN METABOLIC PANEL: CPT

## 2021-08-29 PROCEDURE — 84132 ASSAY OF SERUM POTASSIUM: CPT

## 2021-08-29 RX ADMIN — AZITHROMYCIN DIHYDRATE SCH MLS/HR: 500 INJECTION, POWDER, LYOPHILIZED, FOR SOLUTION INTRAVENOUS at 16:19

## 2021-08-29 RX ADMIN — PANTOPRAZOLE SODIUM SCH MG: 40 TABLET, DELAYED RELEASE ORAL at 16:19

## 2021-08-30 VITALS — SYSTOLIC BLOOD PRESSURE: 158 MMHG | DIASTOLIC BLOOD PRESSURE: 78 MMHG

## 2021-08-30 LAB
ALBUMIN SERPL-MCNC: 2.7 G/DL (ref 3.4–5)
ALP SERPL-CCNC: 144 U/L (ref 45–117)
ALT SERPL-CCNC: 35 U/L (ref 16–61)
ANION GAP SERPL CALCULATED.3IONS-SCNC: 18 MMOL/L (ref 5–15)
BILIRUB SERPL-MCNC: 0.6 MG/DL (ref 0.2–1)
BUN SERPL-MCNC: 89 MG/DL (ref 7–18)
BUN/CREAT SERPL: 4.3
CALCIUM SERPL-MCNC: 7.1 MG/DL (ref 8.5–10.1)
CHLORIDE SERPL-SCNC: 92 MMOL/L (ref 98–107)
CO2 SERPL-SCNC: 17 MMOL/L (ref 21–32)
GLUCOSE SERPL-MCNC: 65 MG/DL (ref 74–106)
HCT VFR BLD AUTO: 41.7 % (ref 41–53)
HGB BLD-MCNC: 13.4 G/DL (ref 13.5–17.5)
MCH RBC QN AUTO: 25.8 PG (ref 28–32)
MCV RBC AUTO: 80.3 FL (ref 80–100)
NRBC BLD QL AUTO: 0.2 %
POTASSIUM SERPL-SCNC: 5.7 MMOL/L (ref 3.5–5.1)
PROT SERPL-MCNC: 8 G/DL (ref 6.4–8.2)
SODIUM SERPL-SCNC: 127 MMOL/L (ref 136–145)

## 2021-08-30 RX ADMIN — PANTOPRAZOLE SODIUM SCH MG: 40 TABLET, DELAYED RELEASE ORAL at 10:23

## 2021-08-30 RX ADMIN — ALBUTEROL SULFATE PRN MCG: 108 AEROSOL, METERED RESPIRATORY (INHALATION) at 20:07

## 2021-08-30 RX ADMIN — AZITHROMYCIN DIHYDRATE SCH MLS/HR: 500 INJECTION, POWDER, LYOPHILIZED, FOR SOLUTION INTRAVENOUS at 10:23

## 2021-08-31 LAB
ALBUMIN SERPL-MCNC: 2.8 G/DL (ref 3.4–5)
ALP SERPL-CCNC: 172 U/L (ref 45–117)
ALT SERPL-CCNC: 44 U/L (ref 16–61)
ANION GAP SERPL CALCULATED.3IONS-SCNC: 19 MMOL/L (ref 5–15)
BILIRUB SERPL-MCNC: 0.6 MG/DL (ref 0.2–1)
BUN SERPL-MCNC: 102 MG/DL (ref 7–18)
BUN/CREAT SERPL: 4.4
CALCIUM SERPL-MCNC: 7.2 MG/DL (ref 8.5–10.1)
CHLORIDE SERPL-SCNC: 91 MMOL/L (ref 98–107)
CO2 SERPL-SCNC: 17 MMOL/L (ref 21–32)
GLUCOSE SERPL-MCNC: 46 MG/DL (ref 74–106)
HCT VFR BLD AUTO: 39.3 % (ref 41–53)
HGB BLD-MCNC: 13.3 G/DL (ref 13.5–17.5)
MCH RBC QN AUTO: 26.1 PG (ref 28–32)
MCV RBC AUTO: 77.1 FL (ref 80–100)
NRBC BLD QL AUTO: 0.1 %
POTASSIUM SERPL-SCNC: 6.2 MMOL/L (ref 3.5–5.1)
PROT SERPL-MCNC: 8.4 G/DL (ref 6.4–8.2)
SODIUM SERPL-SCNC: 127 MMOL/L (ref 136–145)

## 2021-08-31 PROCEDURE — 5A1D70Z PERFORMANCE OF URINARY FILTRATION, INTERMITTENT, LESS THAN 6 HOURS PER DAY: ICD-10-PCS | Performed by: STUDENT IN AN ORGANIZED HEALTH CARE EDUCATION/TRAINING PROGRAM

## 2021-08-31 RX ADMIN — ONDANSETRON HYDROCHLORIDE PRN MG: 2 INJECTION, SOLUTION INTRAMUSCULAR; INTRAVENOUS at 04:10

## 2021-08-31 RX ADMIN — OXYCODONE HYDROCHLORIDE AND ACETAMINOPHEN SCH MG: 500 TABLET ORAL at 09:44

## 2021-08-31 RX ADMIN — AZITHROMYCIN DIHYDRATE SCH MLS/HR: 500 INJECTION, POWDER, LYOPHILIZED, FOR SOLUTION INTRAVENOUS at 10:22

## 2021-08-31 RX ADMIN — Medication SCH UNIT: at 09:44

## 2021-08-31 RX ADMIN — ALBUTEROL SULFATE PRN MCG: 108 AEROSOL, METERED RESPIRATORY (INHALATION) at 06:04

## 2021-08-31 RX ADMIN — ZINC SULFATE CAP 220 MG (50 MG ELEMENTAL ZN) SCH MG: 220 (50 ZN) CAP at 09:43

## 2021-08-31 RX ADMIN — PANTOPRAZOLE SODIUM SCH MG: 40 TABLET, DELAYED RELEASE ORAL at 10:22

## 2021-08-31 RX ADMIN — ONDANSETRON HYDROCHLORIDE PRN MG: 2 INJECTION, SOLUTION INTRAMUSCULAR; INTRAVENOUS at 17:15

## 2021-08-31 RX ADMIN — ALBUTEROL SULFATE PRN MCG: 108 AEROSOL, METERED RESPIRATORY (INHALATION) at 19:41

## 2021-09-01 VITALS — SYSTOLIC BLOOD PRESSURE: 110 MMHG | DIASTOLIC BLOOD PRESSURE: 64 MMHG

## 2021-09-01 VITALS — DIASTOLIC BLOOD PRESSURE: 57 MMHG | SYSTOLIC BLOOD PRESSURE: 108 MMHG

## 2021-09-01 VITALS — SYSTOLIC BLOOD PRESSURE: 115 MMHG | DIASTOLIC BLOOD PRESSURE: 65 MMHG

## 2021-09-01 VITALS — SYSTOLIC BLOOD PRESSURE: 141 MMHG | DIASTOLIC BLOOD PRESSURE: 69 MMHG

## 2021-09-01 VITALS — SYSTOLIC BLOOD PRESSURE: 123 MMHG | DIASTOLIC BLOOD PRESSURE: 68 MMHG

## 2021-09-01 VITALS — SYSTOLIC BLOOD PRESSURE: 102 MMHG | DIASTOLIC BLOOD PRESSURE: 48 MMHG

## 2021-09-01 LAB
ALBUMIN SERPL-MCNC: 2.5 G/DL (ref 3.4–5)
ALP SERPL-CCNC: 180 U/L (ref 45–117)
ALT SERPL-CCNC: 43 U/L (ref 16–61)
ANION GAP SERPL CALCULATED.3IONS-SCNC: 13 MMOL/L (ref 5–15)
BILIRUB SERPL-MCNC: 1 MG/DL (ref 0.2–1)
BUN SERPL-MCNC: 67 MG/DL (ref 7–18)
BUN/CREAT SERPL: 3.8
CALCIUM SERPL-MCNC: 7.6 MG/DL (ref 8.5–10.1)
CHLORIDE SERPL-SCNC: 95 MMOL/L (ref 98–107)
CO2 SERPL-SCNC: 24 MMOL/L (ref 21–32)
GLUCOSE SERPL-MCNC: 70 MG/DL (ref 74–106)
HCT VFR BLD AUTO: 39.9 % (ref 41–53)
HGB BLD-MCNC: 13.4 G/DL (ref 13.5–17.5)
MCH RBC QN AUTO: 25.9 PG (ref 28–32)
MCV RBC AUTO: 77.2 FL (ref 80–100)
NRBC BLD QL AUTO: 1 %
POTASSIUM SERPL-SCNC: 5.2 MMOL/L (ref 3.5–5.1)
PROT SERPL-MCNC: 8 G/DL (ref 6.4–8.2)
SODIUM SERPL-SCNC: 132 MMOL/L (ref 136–145)

## 2021-09-01 RX ADMIN — PANTOPRAZOLE SODIUM SCH MG: 40 TABLET, DELAYED RELEASE ORAL at 11:09

## 2021-09-01 RX ADMIN — OXYCODONE HYDROCHLORIDE AND ACETAMINOPHEN SCH MG: 500 TABLET ORAL at 11:09

## 2021-09-01 RX ADMIN — Medication SCH UNIT: at 11:10

## 2021-09-01 RX ADMIN — ONDANSETRON HYDROCHLORIDE PRN MG: 2 INJECTION, SOLUTION INTRAMUSCULAR; INTRAVENOUS at 01:06

## 2021-09-01 RX ADMIN — ALBUTEROL SULFATE PRN MCG: 108 AEROSOL, METERED RESPIRATORY (INHALATION) at 12:08

## 2021-09-01 RX ADMIN — ZINC SULFATE CAP 220 MG (50 MG ELEMENTAL ZN) SCH MG: 220 (50 ZN) CAP at 11:09

## 2021-09-01 RX ADMIN — AZITHROMYCIN MONOHYDRATE SCH MG: 250 TABLET ORAL at 11:10

## 2021-09-02 VITALS — SYSTOLIC BLOOD PRESSURE: 152 MMHG | DIASTOLIC BLOOD PRESSURE: 72 MMHG

## 2021-09-02 VITALS — SYSTOLIC BLOOD PRESSURE: 148 MMHG | DIASTOLIC BLOOD PRESSURE: 70 MMHG

## 2021-09-02 VITALS — DIASTOLIC BLOOD PRESSURE: 76 MMHG | SYSTOLIC BLOOD PRESSURE: 151 MMHG

## 2021-09-02 VITALS — SYSTOLIC BLOOD PRESSURE: 149 MMHG | DIASTOLIC BLOOD PRESSURE: 78 MMHG

## 2021-09-02 VITALS — SYSTOLIC BLOOD PRESSURE: 160 MMHG | DIASTOLIC BLOOD PRESSURE: 79 MMHG

## 2021-09-02 PROCEDURE — 5A1D70Z PERFORMANCE OF URINARY FILTRATION, INTERMITTENT, LESS THAN 6 HOURS PER DAY: ICD-10-PCS | Performed by: STUDENT IN AN ORGANIZED HEALTH CARE EDUCATION/TRAINING PROGRAM

## 2021-09-02 RX ADMIN — OXYCODONE HYDROCHLORIDE AND ACETAMINOPHEN SCH MG: 500 TABLET ORAL at 10:59

## 2021-09-02 RX ADMIN — AZITHROMYCIN MONOHYDRATE SCH MG: 250 TABLET ORAL at 10:59

## 2021-09-02 RX ADMIN — PANTOPRAZOLE SODIUM SCH MG: 40 TABLET, DELAYED RELEASE ORAL at 10:58

## 2021-09-02 RX ADMIN — ZINC SULFATE CAP 220 MG (50 MG ELEMENTAL ZN) SCH MG: 220 (50 ZN) CAP at 10:58

## 2021-09-02 RX ADMIN — Medication SCH UNIT: at 10:59

## 2023-11-19 ENCOUNTER — HOSPITAL ENCOUNTER (INPATIENT)
Dept: HOSPITAL 15 - ER | Age: 53
LOS: 9 days | Discharge: HOME HEALTH | DRG: 853 | End: 2023-11-28
Attending: NURSE PRACTITIONER | Admitting: NURSE PRACTITIONER
Payer: MEDICARE

## 2023-11-19 VITALS — RESPIRATION RATE: 15 BRPM | HEART RATE: 116 BPM | OXYGEN SATURATION: 100 %

## 2023-11-19 VITALS — OXYGEN SATURATION: 99 % | RESPIRATION RATE: 20 BRPM | HEART RATE: 108 BPM

## 2023-11-19 VITALS — BODY MASS INDEX: 36.32 KG/M2 | WEIGHT: 274.03 LBS | HEIGHT: 73 IN

## 2023-11-19 DIAGNOSIS — Z87.891: ICD-10-CM

## 2023-11-19 DIAGNOSIS — L03.116: ICD-10-CM

## 2023-11-19 DIAGNOSIS — Z79.82: ICD-10-CM

## 2023-11-19 DIAGNOSIS — Z79.899: ICD-10-CM

## 2023-11-19 DIAGNOSIS — I96: ICD-10-CM

## 2023-11-19 DIAGNOSIS — E11.52: ICD-10-CM

## 2023-11-19 DIAGNOSIS — D63.1: ICD-10-CM

## 2023-11-19 DIAGNOSIS — E11.22: ICD-10-CM

## 2023-11-19 DIAGNOSIS — Z79.01: ICD-10-CM

## 2023-11-19 DIAGNOSIS — E78.00: ICD-10-CM

## 2023-11-19 DIAGNOSIS — T86.12: ICD-10-CM
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
## 2023-11-19 DIAGNOSIS — Z83.3: ICD-10-CM
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
## 2023-11-19 DIAGNOSIS — E66.9: ICD-10-CM

## 2023-11-19 DIAGNOSIS — E87.5: ICD-10-CM

## 2023-11-19 DIAGNOSIS — I12.0: ICD-10-CM

## 2023-11-19 DIAGNOSIS — A41.9: Primary | ICD-10-CM

## 2023-11-19 DIAGNOSIS — E11.69: ICD-10-CM

## 2023-11-19 DIAGNOSIS — Z86.718: ICD-10-CM

## 2023-11-19 DIAGNOSIS — M86.8X7: ICD-10-CM

## 2023-11-19 DIAGNOSIS — I48.91: ICD-10-CM

## 2023-11-19 DIAGNOSIS — N18.6: ICD-10-CM

## 2023-11-19 LAB
ALBUMIN SERPL-MCNC: 4.2 G/DL (ref 3.2–4.8)
ALP SERPL-CCNC: 227 U/L (ref 46–116)
ALT SERPL-CCNC: 19 U/L (ref 7–40)
ANION GAP SERPL CALCULATED.3IONS-SCNC: 10 MMOL/L (ref 5–15)
BILIRUB SERPL-MCNC: 1.2 MG/DL (ref 0.2–1)
BUN SERPL-MCNC: 48 MG/DL (ref 9–23)
BUN/CREAT SERPL: 3.8 (ref 10–20)
CALCIUM SERPL-MCNC: 9.5 MG/DL (ref 8.7–10.4)
CHLORIDE SERPL-SCNC: 91 MMOL/L (ref 98–107)
CO2 SERPL-SCNC: 25 MMOL/L (ref 20–30)
GLUCOSE SERPL-MCNC: 82 MG/DL (ref 74–106)
HCT VFR BLD AUTO: 41.9 % (ref 41–53)
HGB BLD-MCNC: 14 G/DL (ref 13.5–17.5)
MCH RBC QN AUTO: 27.5 PG (ref 28–32)
MCV RBC AUTO: 82.3 FL (ref 80–100)
NRBC BLD QL AUTO: 0.2 %
POTASSIUM SERPL-SCNC: 5.9 MMOL/L (ref 3.5–5.1)
PROT SERPL-MCNC: 8.3 G/DL (ref 5.7–8.2)
SODIUM SERPL-SCNC: 126 MMOL/L (ref 136–145)

## 2023-11-19 PROCEDURE — 96367 TX/PROPH/DG ADDL SEQ IV INF: CPT

## 2023-11-19 PROCEDURE — 84132 ASSAY OF SERUM POTASSIUM: CPT

## 2023-11-19 PROCEDURE — 76937 US GUIDE VASCULAR ACCESS: CPT

## 2023-11-19 PROCEDURE — 71260 CT THORAX DX C+: CPT

## 2023-11-19 PROCEDURE — 96368 THER/DIAG CONCURRENT INF: CPT

## 2023-11-19 PROCEDURE — 76000 FLUOROSCOPY <1 HR PHYS/QHP: CPT

## 2023-11-19 PROCEDURE — 85027 COMPLETE CBC AUTOMATED: CPT

## 2023-11-19 PROCEDURE — 87070 CULTURE OTHR SPECIMN AEROBIC: CPT

## 2023-11-19 PROCEDURE — 83605 ASSAY OF LACTIC ACID: CPT

## 2023-11-19 PROCEDURE — 36415 COLL VENOUS BLD VENIPUNCTURE: CPT

## 2023-11-19 PROCEDURE — 83036 HEMOGLOBIN GLYCOSYLATED A1C: CPT

## 2023-11-19 PROCEDURE — 93971 EXTREMITY STUDY: CPT

## 2023-11-19 PROCEDURE — 87075 CULTR BACTERIA EXCEPT BLOOD: CPT

## 2023-11-19 PROCEDURE — 93005 ELECTROCARDIOGRAM TRACING: CPT

## 2023-11-19 PROCEDURE — 80053 COMPREHEN METABOLIC PANEL: CPT

## 2023-11-19 PROCEDURE — 85610 PROTHROMBIN TIME: CPT

## 2023-11-19 PROCEDURE — 87205 SMEAR GRAM STAIN: CPT

## 2023-11-19 PROCEDURE — 96375 TX/PRO/DX INJ NEW DRUG ADDON: CPT

## 2023-11-19 PROCEDURE — 93306 TTE W/DOPPLER COMPLETE: CPT

## 2023-11-19 PROCEDURE — 93926 LOWER EXTREMITY STUDY: CPT

## 2023-11-19 PROCEDURE — 87077 CULTURE AEROBIC IDENTIFY: CPT

## 2023-11-19 PROCEDURE — 99152 MOD SED SAME PHYS/QHP 5/>YRS: CPT

## 2023-11-19 PROCEDURE — 80048 BASIC METABOLIC PNL TOTAL CA: CPT

## 2023-11-19 PROCEDURE — 87076 CULTURE ANAEROBE IDENT EACH: CPT

## 2023-11-19 PROCEDURE — 86901 BLOOD TYPING SEROLOGIC RH(D): CPT

## 2023-11-19 PROCEDURE — 96365 THER/PROPH/DIAG IV INF INIT: CPT

## 2023-11-19 PROCEDURE — 82962 GLUCOSE BLOOD TEST: CPT

## 2023-11-19 PROCEDURE — 85007 BL SMEAR W/DIFF WBC COUNT: CPT

## 2023-11-19 PROCEDURE — 87186 SC STD MICRODIL/AGAR DIL: CPT

## 2023-11-19 PROCEDURE — 80202 ASSAY OF VANCOMYCIN: CPT

## 2023-11-19 PROCEDURE — 73700 CT LOWER EXTREMITY W/O DYE: CPT

## 2023-11-19 PROCEDURE — 85025 COMPLETE CBC W/AUTO DIFF WBC: CPT

## 2023-11-19 PROCEDURE — 87340 HEPATITIS B SURFACE AG IA: CPT

## 2023-11-19 PROCEDURE — 87040 BLOOD CULTURE FOR BACTERIA: CPT

## 2023-11-19 PROCEDURE — 71045 X-RAY EXAM CHEST 1 VIEW: CPT

## 2023-11-19 PROCEDURE — 86850 RBC ANTIBODY SCREEN: CPT

## 2023-11-19 PROCEDURE — 85730 THROMBOPLASTIN TIME PARTIAL: CPT

## 2023-11-19 PROCEDURE — 90935 HEMODIALYSIS ONE EVALUATION: CPT

## 2023-11-19 PROCEDURE — 86900 BLOOD TYPING SEROLOGIC ABO: CPT

## 2023-11-19 RX ADMIN — ATORVASTATIN CALCIUM SCH MG: 20 TABLET, FILM COATED ORAL at 21:09

## 2023-11-20 VITALS
SYSTOLIC BLOOD PRESSURE: 152 MMHG | OXYGEN SATURATION: 96 % | RESPIRATION RATE: 20 BRPM | DIASTOLIC BLOOD PRESSURE: 49 MMHG | TEMPERATURE: 102 F | HEART RATE: 90 BPM

## 2023-11-20 VITALS — TEMPERATURE: 99.3 F

## 2023-11-20 VITALS — RESPIRATION RATE: 18 BRPM | OXYGEN SATURATION: 96 %

## 2023-11-20 VITALS — HEART RATE: 97 BPM

## 2023-11-20 LAB
ALBUMIN SERPL-MCNC: 3.6 G/DL (ref 3.2–4.8)
ALBUMIN SERPL-MCNC: 3.7 G/DL (ref 3.2–4.8)
ALP SERPL-CCNC: 199 U/L (ref 46–116)
ALP SERPL-CCNC: 226 U/L (ref 46–116)
ALT SERPL-CCNC: 12 U/L (ref 7–40)
ALT SERPL-CCNC: 18 U/L (ref 7–40)
ANION GAP SERPL CALCULATED.3IONS-SCNC: 12 MMOL/L (ref 5–15)
ANION GAP SERPL CALCULATED.3IONS-SCNC: 8 MMOL/L (ref 5–15)
APTT PPP: 33.3 SEC (ref 24.5–34.5)
BILIRUB SERPL-MCNC: 1.6 MG/DL (ref 0.2–1)
BILIRUB SERPL-MCNC: 1.6 MG/DL (ref 0.2–1)
BUN SERPL-MCNC: 38 MG/DL (ref 9–23)
BUN SERPL-MCNC: 68 MG/DL (ref 9–23)
BUN/CREAT SERPL: 3.6 (ref 10–20)
BUN/CREAT SERPL: 5 (ref 10–20)
CALCIUM SERPL-MCNC: 9 MG/DL (ref 8.7–10.4)
CALCIUM SERPL-MCNC: 9.2 MG/DL (ref 8.7–10.4)
CHLORIDE SERPL-SCNC: 90 MMOL/L (ref 98–107)
CHLORIDE SERPL-SCNC: 91 MMOL/L (ref 98–107)
CO2 SERPL-SCNC: 24 MMOL/L (ref 20–30)
CO2 SERPL-SCNC: 29 MMOL/L (ref 20–30)
GLUCOSE SERPL-MCNC: 119 MG/DL (ref 74–106)
GLUCOSE SERPL-MCNC: 79 MG/DL (ref 74–106)
HCT VFR BLD AUTO: 39 % (ref 41–53)
HCT VFR BLD AUTO: 39.5 % (ref 41–53)
HGB BLD-MCNC: 13 G/DL (ref 13.5–17.5)
HGB BLD-MCNC: 13.2 G/DL (ref 13.5–17.5)
INR PPP: 1.16 (ref 0.9–1.15)
MCH RBC QN AUTO: 27 PG (ref 28–32)
MCH RBC QN AUTO: 27.4 PG (ref 28–32)
MCV RBC AUTO: 80.9 FL (ref 80–100)
MCV RBC AUTO: 82.3 FL (ref 80–100)
NRBC BLD QL AUTO: 0 %
NRBC BLD QL AUTO: 0.1 %
POTASSIUM SERPL-SCNC: 4.8 MMOL/L (ref 3.5–5.1)
POTASSIUM SERPL-SCNC: 6.2 MMOL/L (ref 3.5–5.1)
PROT SERPL-MCNC: 7.2 G/DL (ref 5.7–8.2)
PROT SERPL-MCNC: 7.4 G/DL (ref 5.7–8.2)
PROTHROMBIN TIME: 12.1 SEC (ref 9.3–11.8)
SODIUM SERPL-SCNC: 126 MMOL/L (ref 136–145)
SODIUM SERPL-SCNC: 128 MMOL/L (ref 136–145)

## 2023-11-20 PROCEDURE — 5A1D70Z PERFORMANCE OF URINARY FILTRATION, INTERMITTENT, LESS THAN 6 HOURS PER DAY: ICD-10-PCS | Performed by: EMERGENCY MEDICINE

## 2023-11-20 RX ADMIN — HUMAN INSULIN SCH UNITS: 100 INJECTION, SOLUTION SUBCUTANEOUS at 00:00

## 2023-11-20 RX ADMIN — ONDANSETRON HYDROCHLORIDE PRN MG: 2 INJECTION, SOLUTION INTRAMUSCULAR; INTRAVENOUS at 09:08

## 2023-11-20 RX ADMIN — MORPHINE SULFATE PRN MG: 2 INJECTION, SOLUTION INTRAMUSCULAR; INTRAVENOUS at 09:07

## 2023-11-20 RX ADMIN — MORPHINE SULFATE PRN MG: 2 INJECTION, SOLUTION INTRAMUSCULAR; INTRAVENOUS at 15:55

## 2023-11-20 RX ADMIN — Medication SCH STRIP: at 00:24

## 2023-11-20 RX ADMIN — LOSARTAN POTASSIUM SCH MG: 25 TABLET, FILM COATED ORAL at 10:00

## 2023-11-20 RX ADMIN — RENAGEL SCH MG: 800 TABLET ORAL at 09:08

## 2023-11-20 RX ADMIN — HUMAN INSULIN SCH UNITS: 100 INJECTION, SOLUTION SUBCUTANEOUS at 06:00

## 2023-11-20 RX ADMIN — RENAGEL SCH MG: 800 TABLET ORAL at 18:27

## 2023-11-20 RX ADMIN — ACETAMINOPHEN PRN MG: 325 TABLET ORAL at 21:07

## 2023-11-20 RX ADMIN — Medication SCH STRIP: at 06:04

## 2023-11-20 RX ADMIN — ATORVASTATIN CALCIUM SCH MG: 20 TABLET, FILM COATED ORAL at 21:00

## 2023-11-20 RX ADMIN — ONDANSETRON HYDROCHLORIDE PRN MG: 2 INJECTION, SOLUTION INTRAMUSCULAR; INTRAVENOUS at 15:55

## 2023-11-20 RX ADMIN — TEMAZEPAM PRN MG: 15 CAPSULE ORAL at 21:00

## 2023-11-20 RX ADMIN — TEMAZEPAM PRN MG: 15 CAPSULE ORAL at 02:21

## 2023-11-20 RX ADMIN — RENAGEL SCH MG: 800 TABLET ORAL at 12:00

## 2023-11-21 VITALS
SYSTOLIC BLOOD PRESSURE: 118 MMHG | OXYGEN SATURATION: 96 % | DIASTOLIC BLOOD PRESSURE: 55 MMHG | HEART RATE: 108 BPM | TEMPERATURE: 99.7 F | RESPIRATION RATE: 16 BRPM

## 2023-11-21 VITALS
SYSTOLIC BLOOD PRESSURE: 142 MMHG | OXYGEN SATURATION: 94 % | TEMPERATURE: 98 F | HEART RATE: 97 BPM | RESPIRATION RATE: 17 BRPM | DIASTOLIC BLOOD PRESSURE: 60 MMHG

## 2023-11-21 VITALS
TEMPERATURE: 100.7 F | OXYGEN SATURATION: 97 % | RESPIRATION RATE: 20 BRPM | DIASTOLIC BLOOD PRESSURE: 43 MMHG | HEART RATE: 109 BPM | SYSTOLIC BLOOD PRESSURE: 134 MMHG

## 2023-11-21 VITALS
SYSTOLIC BLOOD PRESSURE: 134 MMHG | TEMPERATURE: 100.7 F | RESPIRATION RATE: 20 BRPM | OXYGEN SATURATION: 97 % | HEART RATE: 109 BPM | DIASTOLIC BLOOD PRESSURE: 43 MMHG

## 2023-11-21 VITALS — RESPIRATION RATE: 15 BRPM | OXYGEN SATURATION: 96 % | HEART RATE: 108 BPM

## 2023-11-21 VITALS
RESPIRATION RATE: 16 BRPM | OXYGEN SATURATION: 97 % | HEART RATE: 110 BPM | SYSTOLIC BLOOD PRESSURE: 153 MMHG | TEMPERATURE: 98.3 F | DIASTOLIC BLOOD PRESSURE: 60 MMHG

## 2023-11-21 VITALS — HEART RATE: 85 BPM | DIASTOLIC BLOOD PRESSURE: 59 MMHG | TEMPERATURE: 99.2 F | SYSTOLIC BLOOD PRESSURE: 150 MMHG

## 2023-11-21 VITALS
RESPIRATION RATE: 15 BRPM | HEART RATE: 99 BPM | OXYGEN SATURATION: 96 % | SYSTOLIC BLOOD PRESSURE: 146 MMHG | DIASTOLIC BLOOD PRESSURE: 61 MMHG | TEMPERATURE: 99.6 F

## 2023-11-21 VITALS — HEART RATE: 110 BPM

## 2023-11-21 VITALS — TEMPERATURE: 99.5 F

## 2023-11-21 VITALS — TEMPERATURE: 102.7 F

## 2023-11-21 RX ADMIN — LOSARTAN POTASSIUM SCH MG: 25 TABLET, FILM COATED ORAL at 09:20

## 2023-11-21 RX ADMIN — ATORVASTATIN CALCIUM SCH MG: 20 TABLET, FILM COATED ORAL at 21:47

## 2023-11-21 RX ADMIN — RENAGEL SCH MG: 800 TABLET ORAL at 09:21

## 2023-11-21 RX ADMIN — ACETAMINOPHEN PRN MG: 325 TABLET ORAL at 03:12

## 2023-11-21 RX ADMIN — RENAGEL SCH MG: 800 TABLET ORAL at 16:08

## 2023-11-21 RX ADMIN — HYDROCODONE BITARTRATE AND ACETAMINOPHEN PRN TAB: 5; 325 TABLET ORAL at 17:38

## 2023-11-21 RX ADMIN — TEMAZEPAM PRN MG: 15 CAPSULE ORAL at 21:55

## 2023-11-22 VITALS
OXYGEN SATURATION: 97 % | DIASTOLIC BLOOD PRESSURE: 50 MMHG | HEART RATE: 84 BPM | RESPIRATION RATE: 17 BRPM | SYSTOLIC BLOOD PRESSURE: 115 MMHG | TEMPERATURE: 97.8 F

## 2023-11-22 VITALS
RESPIRATION RATE: 16 BRPM | OXYGEN SATURATION: 98 % | DIASTOLIC BLOOD PRESSURE: 40 MMHG | SYSTOLIC BLOOD PRESSURE: 133 MMHG | TEMPERATURE: 99.9 F | HEART RATE: 100 BPM

## 2023-11-22 VITALS
HEART RATE: 97 BPM | OXYGEN SATURATION: 94 % | DIASTOLIC BLOOD PRESSURE: 45 MMHG | TEMPERATURE: 100.2 F | SYSTOLIC BLOOD PRESSURE: 119 MMHG | RESPIRATION RATE: 16 BRPM

## 2023-11-22 VITALS
HEART RATE: 114 BPM | SYSTOLIC BLOOD PRESSURE: 122 MMHG | TEMPERATURE: 98.4 F | DIASTOLIC BLOOD PRESSURE: 83 MMHG | RESPIRATION RATE: 20 BRPM | OXYGEN SATURATION: 95 %

## 2023-11-22 VITALS
RESPIRATION RATE: 19 BRPM | TEMPERATURE: 98.7 F | OXYGEN SATURATION: 100 % | SYSTOLIC BLOOD PRESSURE: 119 MMHG | DIASTOLIC BLOOD PRESSURE: 92 MMHG | HEART RATE: 96 BPM

## 2023-11-22 VITALS
DIASTOLIC BLOOD PRESSURE: 49 MMHG | RESPIRATION RATE: 20 BRPM | SYSTOLIC BLOOD PRESSURE: 111 MMHG | HEART RATE: 100 BPM | TEMPERATURE: 97.7 F

## 2023-11-22 VITALS — HEART RATE: 91 BPM

## 2023-11-22 VITALS — HEART RATE: 102 BPM

## 2023-11-22 LAB
APTT PPP: 36.4 SEC (ref 24.5–34.5)
APTT PPP: 37.2 SEC (ref 24.5–34.5)
HCT VFR BLD AUTO: 39 % (ref 41–53)
HGB BLD-MCNC: 12.6 G/DL (ref 13.5–17.5)
INR PPP: 1.24 (ref 0.9–1.15)
INR PPP: 1.3 (ref 0.9–1.15)
MCH RBC QN AUTO: 26.4 PG (ref 28–32)
MCV RBC AUTO: 82 FL (ref 80–100)
NRBC BLD QL AUTO: 0 %
PROTHROMBIN TIME: 12.8 SEC (ref 9.3–11.8)
PROTHROMBIN TIME: 13.4 SEC (ref 9.3–11.8)

## 2023-11-22 PROCEDURE — 5A1D70Z PERFORMANCE OF URINARY FILTRATION, INTERMITTENT, LESS THAN 6 HOURS PER DAY: ICD-10-PCS | Performed by: EMERGENCY MEDICINE

## 2023-11-22 RX ADMIN — ACETAMINOPHEN PRN MG: 325 TABLET ORAL at 05:30

## 2023-11-22 RX ADMIN — ATORVASTATIN CALCIUM SCH MG: 20 TABLET, FILM COATED ORAL at 22:07

## 2023-11-22 RX ADMIN — RENAGEL SCH MG: 800 TABLET ORAL at 12:16

## 2023-11-22 RX ADMIN — TEMAZEPAM PRN MG: 15 CAPSULE ORAL at 22:07

## 2023-11-22 RX ADMIN — CEFEPIME SCH MLS/HR: 1 INJECTION, POWDER, FOR SOLUTION INTRAMUSCULAR; INTRAVENOUS at 09:43

## 2023-11-22 RX ADMIN — RENAGEL SCH MG: 800 TABLET ORAL at 09:43

## 2023-11-22 RX ADMIN — RENAGEL SCH MG: 800 TABLET ORAL at 18:00

## 2023-11-22 RX ADMIN — LOSARTAN POTASSIUM SCH MG: 25 TABLET, FILM COATED ORAL at 09:51

## 2023-11-23 VITALS
SYSTOLIC BLOOD PRESSURE: 115 MMHG | TEMPERATURE: 98.6 F | OXYGEN SATURATION: 92 % | RESPIRATION RATE: 16 BRPM | DIASTOLIC BLOOD PRESSURE: 34 MMHG | HEART RATE: 104 BPM

## 2023-11-23 VITALS
OXYGEN SATURATION: 97 % | RESPIRATION RATE: 16 BRPM | HEART RATE: 72 BPM | TEMPERATURE: 98.8 F | SYSTOLIC BLOOD PRESSURE: 126 MMHG | DIASTOLIC BLOOD PRESSURE: 56 MMHG

## 2023-11-23 VITALS
SYSTOLIC BLOOD PRESSURE: 102 MMHG | RESPIRATION RATE: 19 BRPM | HEART RATE: 70 BPM | TEMPERATURE: 98.4 F | OXYGEN SATURATION: 95 % | DIASTOLIC BLOOD PRESSURE: 32 MMHG

## 2023-11-23 VITALS — RESPIRATION RATE: 18 BRPM | HEART RATE: 86 BPM

## 2023-11-23 VITALS — OXYGEN SATURATION: 93 % | RESPIRATION RATE: 20 BRPM

## 2023-11-23 VITALS
HEART RATE: 68 BPM | DIASTOLIC BLOOD PRESSURE: 52 MMHG | RESPIRATION RATE: 18 BRPM | SYSTOLIC BLOOD PRESSURE: 128 MMHG | TEMPERATURE: 98.2 F | OXYGEN SATURATION: 90 %

## 2023-11-23 VITALS — HEART RATE: 81 BPM

## 2023-11-23 LAB
APTT PPP: 35.2 SEC (ref 24.5–34.5)
APTT PPP: 41 SEC (ref 24.5–34.5)
HCT VFR BLD AUTO: 36.1 % (ref 41–53)
HGB BLD-MCNC: 11.5 G/DL (ref 13.5–17.5)
INR PPP: 1.26 (ref 0.9–1.15)
INR PPP: 1.27 (ref 0.9–1.15)
MCH RBC QN AUTO: 26.7 PG (ref 28–32)
MCV RBC AUTO: 83.7 FL (ref 80–100)
NRBC BLD QL AUTO: 0 %
PROTHROMBIN TIME: 13 SEC (ref 9.3–11.8)
PROTHROMBIN TIME: 13.1 SEC (ref 9.3–11.8)

## 2023-11-23 PROCEDURE — 0Y6N0ZC DETACHMENT AT LEFT FOOT, PARTIAL 3RD RAY, OPEN APPROACH: ICD-10-PCS | Performed by: STUDENT IN AN ORGANIZED HEALTH CARE EDUCATION/TRAINING PROGRAM

## 2023-11-23 RX ADMIN — RENAGEL SCH MG: 800 TABLET ORAL at 14:37

## 2023-11-23 RX ADMIN — ATORVASTATIN CALCIUM SCH MG: 20 TABLET, FILM COATED ORAL at 22:13

## 2023-11-23 RX ADMIN — LOSARTAN POTASSIUM SCH MG: 25 TABLET, FILM COATED ORAL at 10:22

## 2023-11-23 RX ADMIN — CEFEPIME SCH MLS/HR: 1 INJECTION, POWDER, FOR SOLUTION INTRAMUSCULAR; INTRAVENOUS at 10:15

## 2023-11-23 RX ADMIN — CLINDAMYCIN PHOSPHATE SCH MLS/HR: 150 INJECTION, SOLUTION INTRAVENOUS at 14:25

## 2023-11-23 RX ADMIN — RENAGEL SCH MG: 800 TABLET ORAL at 08:00

## 2023-11-23 RX ADMIN — HEPARIN SODIUM AND DEXTROSE SCH MLS/HR: 10000; 5 INJECTION INTRAVENOUS at 17:34

## 2023-11-23 RX ADMIN — CLINDAMYCIN PHOSPHATE SCH MLS/HR: 150 INJECTION, SOLUTION INTRAVENOUS at 05:00

## 2023-11-23 RX ADMIN — RENAGEL SCH MG: 800 TABLET ORAL at 17:30

## 2023-11-23 RX ADMIN — CLINDAMYCIN PHOSPHATE SCH MLS/HR: 150 INJECTION, SOLUTION INTRAVENOUS at 22:14

## 2023-11-24 VITALS
SYSTOLIC BLOOD PRESSURE: 108 MMHG | DIASTOLIC BLOOD PRESSURE: 28 MMHG | HEART RATE: 72 BPM | RESPIRATION RATE: 20 BRPM | TEMPERATURE: 97.9 F | OXYGEN SATURATION: 95 %

## 2023-11-24 VITALS
HEART RATE: 83 BPM | SYSTOLIC BLOOD PRESSURE: 101 MMHG | DIASTOLIC BLOOD PRESSURE: 58 MMHG | TEMPERATURE: 99.2 F | RESPIRATION RATE: 16 BRPM | OXYGEN SATURATION: 95 %

## 2023-11-24 VITALS
HEART RATE: 60 BPM | RESPIRATION RATE: 16 BRPM | OXYGEN SATURATION: 96 % | SYSTOLIC BLOOD PRESSURE: 143 MMHG | DIASTOLIC BLOOD PRESSURE: 59 MMHG | TEMPERATURE: 98.2 F

## 2023-11-24 VITALS
DIASTOLIC BLOOD PRESSURE: 22 MMHG | RESPIRATION RATE: 17 BRPM | SYSTOLIC BLOOD PRESSURE: 102 MMHG | TEMPERATURE: 98.4 F | HEART RATE: 58 BPM | OXYGEN SATURATION: 95 %

## 2023-11-24 VITALS — HEART RATE: 61 BPM

## 2023-11-24 VITALS — HEART RATE: 72 BPM

## 2023-11-24 LAB
ALBUMIN SERPL-MCNC: 2.8 G/DL (ref 3.2–4.8)
ALBUMIN SERPL-MCNC: 3.4 G/DL (ref 3.2–4.8)
ALP SERPL-CCNC: 137 U/L (ref 46–116)
ALP SERPL-CCNC: 153 U/L (ref 46–116)
ALT SERPL-CCNC: 12 U/L (ref 7–40)
ALT SERPL-CCNC: < 9 U/L (ref 7–40)
ANION GAP SERPL CALCULATED.3IONS-SCNC: 10 MMOL/L (ref 5–15)
ANION GAP SERPL CALCULATED.3IONS-SCNC: 7 MMOL/L (ref 5–15)
APTT PPP: 25.6 SEC (ref 24.5–34.5)
APTT PPP: 33.9 SEC (ref 24.5–34.5)
APTT PPP: 82.4 SEC (ref 24.5–34.5)
BILIRUB SERPL-MCNC: 0.4 MG/DL (ref 0.2–1)
BILIRUB SERPL-MCNC: 0.5 MG/DL (ref 0.2–1)
BUN SERPL-MCNC: 42 MG/DL (ref 9–23)
BUN SERPL-MCNC: 54 MG/DL (ref 9–23)
BUN/CREAT SERPL: 4 (ref 10–20)
BUN/CREAT SERPL: 5.3 (ref 10–20)
CALCIUM SERPL-MCNC: 8.7 MG/DL (ref 8.5–10.1)
CALCIUM SERPL-MCNC: 9.6 MG/DL (ref 8.5–10.1)
CHLORIDE SERPL-SCNC: 95 MMOL/L (ref 98–107)
CHLORIDE SERPL-SCNC: 97 MMOL/L (ref 98–107)
CO2 SERPL-SCNC: 25 MMOL/L (ref 20–30)
CO2 SERPL-SCNC: 27 MMOL/L (ref 20–30)
GLUCOSE SERPL-MCNC: 114 MG/DL (ref 74–106)
GLUCOSE SERPL-MCNC: 86 MG/DL (ref 74–106)
HCT VFR BLD AUTO: 31 % (ref 41–53)
HGB BLD-MCNC: 10.1 G/DL (ref 13.5–17.5)
INR PPP: 1.17 (ref 0.9–1.15)
INR PPP: 1.21 (ref 0.9–1.15)
INR PPP: 1.59 (ref 0.9–1.15)
MCH RBC QN AUTO: 26.6 PG (ref 28–32)
MCV RBC AUTO: 81.6 FL (ref 80–100)
NRBC BLD QL AUTO: 0.1 %
POTASSIUM SERPL-SCNC: 5.1 MMOL/L (ref 3.5–5.1)
POTASSIUM SERPL-SCNC: 5.9 MMOL/L (ref 3.5–5.1)
PROT SERPL-MCNC: 5.8 G/DL (ref 5.7–8.2)
PROT SERPL-MCNC: 6.9 G/DL (ref 5.7–8.2)
PROTHROMBIN TIME: 12.2 SEC (ref 9.3–11.8)
PROTHROMBIN TIME: 12.5 SEC (ref 9.3–11.8)
PROTHROMBIN TIME: 16.2 SEC (ref 9.3–11.8)
SODIUM SERPL-SCNC: 130 MMOL/L (ref 136–145)
SODIUM SERPL-SCNC: 131 MMOL/L (ref 136–145)

## 2023-11-24 PROCEDURE — 5A1D70Z PERFORMANCE OF URINARY FILTRATION, INTERMITTENT, LESS THAN 6 HOURS PER DAY: ICD-10-PCS | Performed by: EMERGENCY MEDICINE

## 2023-11-24 RX ADMIN — SODIUM HYPOCHLORITE SCH ML: 2.5 SOLUTION TOPICAL at 20:43

## 2023-11-24 RX ADMIN — CLINDAMYCIN PHOSPHATE SCH MLS/HR: 150 INJECTION, SOLUTION INTRAVENOUS at 06:00

## 2023-11-24 RX ADMIN — RENAGEL SCH MG: 800 TABLET ORAL at 18:20

## 2023-11-24 RX ADMIN — CLINDAMYCIN PHOSPHATE SCH MLS/HR: 150 INJECTION, SOLUTION INTRAVENOUS at 20:43

## 2023-11-24 RX ADMIN — CLINDAMYCIN PHOSPHATE SCH MLS/HR: 150 INJECTION, SOLUTION INTRAVENOUS at 13:19

## 2023-11-24 RX ADMIN — CEFEPIME SCH MLS/HR: 1 INJECTION, POWDER, FOR SOLUTION INTRAMUSCULAR; INTRAVENOUS at 09:54

## 2023-11-24 RX ADMIN — RENAGEL SCH MG: 800 TABLET ORAL at 11:52

## 2023-11-24 RX ADMIN — ATORVASTATIN CALCIUM SCH MG: 20 TABLET, FILM COATED ORAL at 20:43

## 2023-11-24 RX ADMIN — RENAGEL SCH MG: 800 TABLET ORAL at 08:00

## 2023-11-24 RX ADMIN — HEPARIN SODIUM AND DEXTROSE SCH MLS/HR: 10000; 5 INJECTION INTRAVENOUS at 06:01

## 2023-11-24 RX ADMIN — LOSARTAN POTASSIUM SCH MG: 25 TABLET, FILM COATED ORAL at 09:55

## 2023-11-24 RX ADMIN — MORPHINE SULFATE PRN MG: 2 INJECTION, SOLUTION INTRAMUSCULAR; INTRAVENOUS at 09:56

## 2023-11-24 RX ADMIN — SODIUM HYPOCHLORITE SCH ML: 2.5 SOLUTION TOPICAL at 12:00

## 2023-11-25 VITALS
SYSTOLIC BLOOD PRESSURE: 146 MMHG | DIASTOLIC BLOOD PRESSURE: 49 MMHG | HEART RATE: 84 BPM | OXYGEN SATURATION: 98 % | TEMPERATURE: 97.5 F | RESPIRATION RATE: 20 BRPM

## 2023-11-25 VITALS
OXYGEN SATURATION: 93 % | SYSTOLIC BLOOD PRESSURE: 132 MMHG | RESPIRATION RATE: 18 BRPM | HEART RATE: 66 BPM | TEMPERATURE: 97.9 F | DIASTOLIC BLOOD PRESSURE: 35 MMHG

## 2023-11-25 VITALS
HEART RATE: 66 BPM | DIASTOLIC BLOOD PRESSURE: 50 MMHG | SYSTOLIC BLOOD PRESSURE: 111 MMHG | TEMPERATURE: 97.3 F | OXYGEN SATURATION: 99 % | RESPIRATION RATE: 18 BRPM

## 2023-11-25 VITALS
RESPIRATION RATE: 19 BRPM | HEART RATE: 62 BPM | TEMPERATURE: 97.8 F | OXYGEN SATURATION: 100 % | DIASTOLIC BLOOD PRESSURE: 78 MMHG | SYSTOLIC BLOOD PRESSURE: 105 MMHG

## 2023-11-25 VITALS
TEMPERATURE: 98.7 F | SYSTOLIC BLOOD PRESSURE: 145 MMHG | OXYGEN SATURATION: 97 % | DIASTOLIC BLOOD PRESSURE: 31 MMHG | RESPIRATION RATE: 20 BRPM | HEART RATE: 81 BPM

## 2023-11-25 VITALS — HEART RATE: 82 BPM

## 2023-11-25 VITALS — HEART RATE: 71 BPM

## 2023-11-25 LAB
ALBUMIN SERPL-MCNC: 3.5 G/DL (ref 3.2–4.8)
ALP SERPL-CCNC: 186 U/L (ref 46–116)
ALT SERPL-CCNC: < 9 U/L (ref 7–40)
ANION GAP SERPL CALCULATED.3IONS-SCNC: 9 MMOL/L (ref 5–15)
APTT PPP: 32.8 SEC (ref 24.5–34.5)
APTT PPP: 47.4 SEC (ref 24.5–34.5)
APTT PPP: 63.5 SEC (ref 24.5–34.5)
BILIRUB SERPL-MCNC: 0.5 MG/DL (ref 0.2–1)
BUN SERPL-MCNC: 51 MG/DL (ref 9–23)
BUN/CREAT SERPL: 4.4 (ref 10–20)
CALCIUM SERPL-MCNC: 9.4 MG/DL (ref 8.7–10.4)
CHLORIDE SERPL-SCNC: 94 MMOL/L (ref 98–107)
CO2 SERPL-SCNC: 25 MMOL/L (ref 20–30)
GLUCOSE SERPL-MCNC: 80 MG/DL (ref 74–106)
HCT VFR BLD AUTO: 37.4 % (ref 41–53)
HGB BLD-MCNC: 12.1 G/DL (ref 13.5–17.5)
INR PPP: 1.16 (ref 0.9–1.15)
INR PPP: 1.19 (ref 0.9–1.15)
INR PPP: 1.21 (ref 0.9–1.15)
MCH RBC QN AUTO: 26.7 PG (ref 28–32)
MCV RBC AUTO: 82.6 FL (ref 80–100)
NRBC BLD QL AUTO: 0 %
POTASSIUM SERPL-SCNC: 5.8 MMOL/L (ref 3.5–5.1)
PROT SERPL-MCNC: 7.4 G/DL (ref 5.7–8.2)
PROTHROMBIN TIME: 12.1 SEC (ref 9.3–11.8)
PROTHROMBIN TIME: 12.4 SEC (ref 9.3–11.8)
PROTHROMBIN TIME: 12.5 SEC (ref 9.3–11.8)
SODIUM SERPL-SCNC: 128 MMOL/L (ref 136–145)

## 2023-11-25 RX ADMIN — HYDROCODONE BITARTRATE AND ACETAMINOPHEN PRN TAB: 5; 325 TABLET ORAL at 20:55

## 2023-11-25 RX ADMIN — SODIUM HYPOCHLORITE SCH ML: 2.5 SOLUTION TOPICAL at 08:45

## 2023-11-25 RX ADMIN — LOSARTAN POTASSIUM SCH MG: 25 TABLET, FILM COATED ORAL at 08:38

## 2023-11-25 RX ADMIN — HEPARIN SODIUM AND DEXTROSE SCH MLS/HR: 10000; 5 INJECTION INTRAVENOUS at 16:42

## 2023-11-25 RX ADMIN — ATORVASTATIN CALCIUM SCH MG: 20 TABLET, FILM COATED ORAL at 20:55

## 2023-11-25 RX ADMIN — RENAGEL SCH MG: 800 TABLET ORAL at 08:21

## 2023-11-25 RX ADMIN — SODIUM HYPOCHLORITE SCH ML: 2.5 SOLUTION TOPICAL at 20:55

## 2023-11-25 RX ADMIN — RENAGEL SCH MG: 800 TABLET ORAL at 17:58

## 2023-11-25 RX ADMIN — CLINDAMYCIN PHOSPHATE SCH MLS/HR: 150 INJECTION, SOLUTION INTRAVENOUS at 05:12

## 2023-11-25 RX ADMIN — HYDROCODONE BITARTRATE AND ACETAMINOPHEN PRN TAB: 5; 325 TABLET ORAL at 08:39

## 2023-11-25 RX ADMIN — RENAGEL SCH MG: 800 TABLET ORAL at 11:44

## 2023-11-25 RX ADMIN — CEFEPIME SCH MLS/HR: 1 INJECTION, POWDER, FOR SOLUTION INTRAMUSCULAR; INTRAVENOUS at 08:38

## 2023-11-26 VITALS
SYSTOLIC BLOOD PRESSURE: 125 MMHG | TEMPERATURE: 97.8 F | DIASTOLIC BLOOD PRESSURE: 62 MMHG | RESPIRATION RATE: 20 BRPM | HEART RATE: 68 BPM | OXYGEN SATURATION: 100 %

## 2023-11-26 VITALS
HEART RATE: 68 BPM | DIASTOLIC BLOOD PRESSURE: 44 MMHG | OXYGEN SATURATION: 96 % | RESPIRATION RATE: 18 BRPM | SYSTOLIC BLOOD PRESSURE: 155 MMHG | TEMPERATURE: 97.9 F

## 2023-11-26 VITALS — RESPIRATION RATE: 18 BRPM | OXYGEN SATURATION: 92 % | HEART RATE: 73 BPM

## 2023-11-26 VITALS
OXYGEN SATURATION: 98 % | RESPIRATION RATE: 18 BRPM | TEMPERATURE: 97.4 F | DIASTOLIC BLOOD PRESSURE: 50 MMHG | HEART RATE: 67 BPM | SYSTOLIC BLOOD PRESSURE: 136 MMHG

## 2023-11-26 VITALS
DIASTOLIC BLOOD PRESSURE: 39 MMHG | OXYGEN SATURATION: 98 % | RESPIRATION RATE: 12 BRPM | SYSTOLIC BLOOD PRESSURE: 138 MMHG | TEMPERATURE: 97.7 F | HEART RATE: 76 BPM

## 2023-11-26 VITALS — HEART RATE: 67 BPM

## 2023-11-26 LAB
ALBUMIN SERPL-MCNC: 3.4 G/DL (ref 3.2–4.8)
ALP SERPL-CCNC: 157 U/L (ref 46–116)
ALT SERPL-CCNC: < 9 U/L (ref 7–40)
ANION GAP SERPL CALCULATED.3IONS-SCNC: 13 MMOL/L (ref 5–15)
APTT PPP: 35 SEC (ref 24.5–34.5)
APTT PPP: 42 SEC (ref 24.5–34.5)
BILIRUB SERPL-MCNC: 0.5 MG/DL (ref 0.2–1)
BUN SERPL-MCNC: 56 MG/DL (ref 9–23)
BUN/CREAT SERPL: 4.4 (ref 10–20)
CALCIUM SERPL-MCNC: 9.2 MG/DL (ref 8.7–10.4)
CELLS COUNTED: 100
CHLORIDE SERPL-SCNC: 92 MMOL/L (ref 98–107)
CO2 SERPL-SCNC: 24 MMOL/L (ref 20–30)
GLUCOSE SERPL-MCNC: 75 MG/DL (ref 74–106)
HCT VFR BLD AUTO: 38.6 % (ref 41–53)
HGB BLD-MCNC: 12.6 G/DL (ref 13.5–17.5)
INR PPP: 1.24 (ref 0.9–1.15)
INR PPP: 1.25 (ref 0.9–1.15)
MCH RBC QN AUTO: 27 PG (ref 28–32)
MCV RBC AUTO: 82.6 FL (ref 80–100)
POTASSIUM SERPL-SCNC: 5.8 MMOL/L (ref 3.5–5.1)
PROT SERPL-MCNC: 7 G/DL (ref 5.7–8.2)
PROTHROMBIN TIME: 12.8 SEC (ref 9.3–11.8)
PROTHROMBIN TIME: 12.9 SEC (ref 9.3–11.8)
SODIUM SERPL-SCNC: 129 MMOL/L (ref 136–145)

## 2023-11-26 PROCEDURE — 5A1D70Z PERFORMANCE OF URINARY FILTRATION, INTERMITTENT, LESS THAN 6 HOURS PER DAY: ICD-10-PCS | Performed by: EMERGENCY MEDICINE

## 2023-11-26 RX ADMIN — RENAGEL SCH MG: 800 TABLET ORAL at 10:31

## 2023-11-26 RX ADMIN — SODIUM HYPOCHLORITE SCH ML: 2.5 SOLUTION TOPICAL at 23:11

## 2023-11-26 RX ADMIN — RENAGEL SCH MG: 800 TABLET ORAL at 17:59

## 2023-11-26 RX ADMIN — DEXTROSE SCH MLS/HR: 50 INJECTION, SOLUTION INTRAVENOUS at 10:31

## 2023-11-26 RX ADMIN — HEPARIN SODIUM AND DEXTROSE SCH MLS/HR: 10000; 5 INJECTION INTRAVENOUS at 00:08

## 2023-11-26 RX ADMIN — APIXABAN SCH MG: 5 TABLET, FILM COATED ORAL at 23:09

## 2023-11-26 RX ADMIN — RENAGEL SCH MG: 800 TABLET ORAL at 12:00

## 2023-11-26 RX ADMIN — SODIUM HYPOCHLORITE SCH ML: 2.5 SOLUTION TOPICAL at 12:38

## 2023-11-26 RX ADMIN — LOSARTAN POTASSIUM SCH MG: 25 TABLET, FILM COATED ORAL at 10:44

## 2023-11-26 RX ADMIN — ATORVASTATIN CALCIUM SCH MG: 20 TABLET, FILM COATED ORAL at 23:09

## 2023-11-27 VITALS
HEART RATE: 82 BPM | TEMPERATURE: 98.6 F | SYSTOLIC BLOOD PRESSURE: 152 MMHG | DIASTOLIC BLOOD PRESSURE: 80 MMHG | RESPIRATION RATE: 18 BRPM | OXYGEN SATURATION: 96 %

## 2023-11-27 VITALS
TEMPERATURE: 99.1 F | SYSTOLIC BLOOD PRESSURE: 119 MMHG | RESPIRATION RATE: 20 BRPM | OXYGEN SATURATION: 97 % | HEART RATE: 75 BPM | DIASTOLIC BLOOD PRESSURE: 29 MMHG

## 2023-11-27 VITALS
SYSTOLIC BLOOD PRESSURE: 139 MMHG | OXYGEN SATURATION: 97 % | DIASTOLIC BLOOD PRESSURE: 47 MMHG | TEMPERATURE: 98.2 F | RESPIRATION RATE: 18 BRPM | HEART RATE: 69 BPM

## 2023-11-27 VITALS
SYSTOLIC BLOOD PRESSURE: 153 MMHG | RESPIRATION RATE: 17 BRPM | TEMPERATURE: 97.6 F | HEART RATE: 69 BPM | OXYGEN SATURATION: 98 % | DIASTOLIC BLOOD PRESSURE: 57 MMHG

## 2023-11-27 VITALS — RESPIRATION RATE: 18 BRPM | OXYGEN SATURATION: 94 % | HEART RATE: 71 BPM

## 2023-11-27 VITALS
TEMPERATURE: 98 F | DIASTOLIC BLOOD PRESSURE: 42 MMHG | SYSTOLIC BLOOD PRESSURE: 172 MMHG | RESPIRATION RATE: 17 BRPM | HEART RATE: 70 BPM | OXYGEN SATURATION: 98 %

## 2023-11-27 VITALS — HEART RATE: 73 BPM

## 2023-11-27 LAB
ANION GAP SERPL CALCULATED.3IONS-SCNC: 11 MMOL/L (ref 5–15)
BUN SERPL-MCNC: 52 MG/DL (ref 9–23)
BUN/CREAT SERPL: 4.2 (ref 10–20)
CALCIUM SERPL-MCNC: 9.3 MG/DL (ref 8.7–10.4)
CHLORIDE SERPL-SCNC: 94 MMOL/L (ref 98–107)
CO2 SERPL-SCNC: 26 MMOL/L (ref 20–30)
GLUCOSE SERPL-MCNC: 78 MG/DL (ref 74–106)
POTASSIUM SERPL-SCNC: 5.4 MMOL/L (ref 3.5–5.1)
SODIUM SERPL-SCNC: 131 MMOL/L (ref 136–145)

## 2023-11-27 RX ADMIN — SODIUM HYPOCHLORITE SCH ML: 2.5 SOLUTION TOPICAL at 10:38

## 2023-11-27 RX ADMIN — DEXTROSE SCH MLS/HR: 50 INJECTION, SOLUTION INTRAVENOUS at 10:38

## 2023-11-27 RX ADMIN — LOSARTAN POTASSIUM SCH MG: 25 TABLET, FILM COATED ORAL at 18:41

## 2023-11-27 RX ADMIN — RENAGEL SCH MG: 800 TABLET ORAL at 18:41

## 2023-11-27 RX ADMIN — RENAGEL SCH MG: 800 TABLET ORAL at 12:00

## 2023-11-27 RX ADMIN — ATORVASTATIN CALCIUM SCH MG: 20 TABLET, FILM COATED ORAL at 22:42

## 2023-11-27 RX ADMIN — RENAGEL SCH MG: 800 TABLET ORAL at 08:00

## 2023-11-27 RX ADMIN — SODIUM HYPOCHLORITE SCH ML: 2.5 SOLUTION TOPICAL at 22:41

## 2023-11-27 RX ADMIN — APIXABAN SCH MG: 5 TABLET, FILM COATED ORAL at 12:10

## 2023-11-27 RX ADMIN — APIXABAN SCH MG: 5 TABLET, FILM COATED ORAL at 22:42

## 2023-11-28 VITALS
DIASTOLIC BLOOD PRESSURE: 59 MMHG | RESPIRATION RATE: 13 BRPM | OXYGEN SATURATION: 99 % | SYSTOLIC BLOOD PRESSURE: 154 MMHG | HEART RATE: 69 BPM | TEMPERATURE: 97.4 F

## 2023-11-28 VITALS
SYSTOLIC BLOOD PRESSURE: 152 MMHG | DIASTOLIC BLOOD PRESSURE: 55 MMHG | OXYGEN SATURATION: 96 % | HEART RATE: 70 BPM | RESPIRATION RATE: 14 BRPM

## 2023-11-28 VITALS
OXYGEN SATURATION: 94 % | HEART RATE: 68 BPM | DIASTOLIC BLOOD PRESSURE: 60 MMHG | RESPIRATION RATE: 18 BRPM | TEMPERATURE: 98.4 F | SYSTOLIC BLOOD PRESSURE: 144 MMHG

## 2023-11-28 VITALS
SYSTOLIC BLOOD PRESSURE: 127 MMHG | HEART RATE: 70 BPM | DIASTOLIC BLOOD PRESSURE: 64 MMHG | OXYGEN SATURATION: 91 % | RESPIRATION RATE: 16 BRPM

## 2023-11-28 VITALS — OXYGEN SATURATION: 95 % | HEART RATE: 77 BPM | RESPIRATION RATE: 17 BRPM

## 2023-11-28 VITALS
HEART RATE: 68 BPM | SYSTOLIC BLOOD PRESSURE: 123 MMHG | RESPIRATION RATE: 18 BRPM | DIASTOLIC BLOOD PRESSURE: 52 MMHG | OXYGEN SATURATION: 93 %

## 2023-11-28 VITALS
OXYGEN SATURATION: 95 % | HEART RATE: 72 BPM | RESPIRATION RATE: 17 BRPM | SYSTOLIC BLOOD PRESSURE: 119 MMHG | TEMPERATURE: 98.4 F | DIASTOLIC BLOOD PRESSURE: 32 MMHG

## 2023-11-28 VITALS
DIASTOLIC BLOOD PRESSURE: 53 MMHG | RESPIRATION RATE: 20 BRPM | TEMPERATURE: 98.4 F | HEART RATE: 72 BPM | SYSTOLIC BLOOD PRESSURE: 155 MMHG | OXYGEN SATURATION: 94 %

## 2023-11-28 PROCEDURE — 0JH63XZ INSERTION OF TUNNELED VASCULAR ACCESS DEVICE INTO CHEST SUBCUTANEOUS TISSUE AND FASCIA, PERCUTANEOUS APPROACH: ICD-10-PCS | Performed by: RADIOLOGY

## 2023-11-28 PROCEDURE — 5A1D70Z PERFORMANCE OF URINARY FILTRATION, INTERMITTENT, LESS THAN 6 HOURS PER DAY: ICD-10-PCS | Performed by: EMERGENCY MEDICINE

## 2023-11-28 PROCEDURE — B548ZZA ULTRASONOGRAPHY OF SUPERIOR VENA CAVA, GUIDANCE: ICD-10-PCS | Performed by: RADIOLOGY

## 2023-11-28 PROCEDURE — B518ZZA FLUOROSCOPY OF SUPERIOR VENA CAVA, GUIDANCE: ICD-10-PCS | Performed by: RADIOLOGY

## 2023-11-28 PROCEDURE — 02HV33Z INSERTION OF INFUSION DEVICE INTO SUPERIOR VENA CAVA, PERCUTANEOUS APPROACH: ICD-10-PCS | Performed by: RADIOLOGY

## 2023-11-28 RX ADMIN — SODIUM HYPOCHLORITE SCH ML: 2.5 SOLUTION TOPICAL at 12:40

## 2023-11-28 RX ADMIN — APIXABAN SCH MG: 5 TABLET, FILM COATED ORAL at 10:00

## 2023-11-28 RX ADMIN — LOSARTAN POTASSIUM SCH MG: 25 TABLET, FILM COATED ORAL at 10:00

## 2023-11-28 RX ADMIN — RENAGEL SCH MG: 800 TABLET ORAL at 12:20

## 2023-11-28 RX ADMIN — DEXTROSE SCH MLS/HR: 50 INJECTION, SOLUTION INTRAVENOUS at 10:00

## 2023-11-28 RX ADMIN — DEXTROSE SCH MLS/HR: 50 INJECTION, SOLUTION INTRAVENOUS at 13:50

## 2023-11-28 RX ADMIN — RENAGEL SCH MG: 800 TABLET ORAL at 08:00

## 2023-12-12 ENCOUNTER — HOSPITAL ENCOUNTER (INPATIENT)
Dept: HOSPITAL 15 - ER | Age: 53
LOS: 2 days | Discharge: HOME HEALTH | DRG: 314 | End: 2023-12-14
Attending: FAMILY MEDICINE
Payer: MEDICARE

## 2023-12-12 VITALS — WEIGHT: 216.05 LBS | BODY MASS INDEX: 28.63 KG/M2 | HEIGHT: 73 IN

## 2023-12-12 DIAGNOSIS — E11.22: ICD-10-CM

## 2023-12-12 DIAGNOSIS — E87.5: ICD-10-CM

## 2023-12-12 DIAGNOSIS — Z82.49: ICD-10-CM

## 2023-12-12 DIAGNOSIS — Z83.3: ICD-10-CM

## 2023-12-12 DIAGNOSIS — E66.9: ICD-10-CM

## 2023-12-12 DIAGNOSIS — I12.0: ICD-10-CM

## 2023-12-12 DIAGNOSIS — Z79.2: ICD-10-CM
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
## 2023-12-12 DIAGNOSIS — M86.8X7: ICD-10-CM

## 2023-12-12 DIAGNOSIS — I96: ICD-10-CM

## 2023-12-12 DIAGNOSIS — L08.9: ICD-10-CM

## 2023-12-12 DIAGNOSIS — D64.9: ICD-10-CM

## 2023-12-12 DIAGNOSIS — N25.81: ICD-10-CM

## 2023-12-12 DIAGNOSIS — Z71.3: ICD-10-CM

## 2023-12-12 DIAGNOSIS — Z99.2: ICD-10-CM

## 2023-12-12 DIAGNOSIS — E11.69: ICD-10-CM

## 2023-12-12 DIAGNOSIS — E78.00: ICD-10-CM

## 2023-12-12 DIAGNOSIS — T82.524A: Primary | ICD-10-CM

## 2023-12-12 DIAGNOSIS — N18.6: ICD-10-CM

## 2023-12-12 LAB
ANION GAP SERPL CALCULATED.3IONS-SCNC: 11 MMOL/L (ref 5–15)
APTT PPP: 31.8 SEC (ref 24.5–34.5)
BUN SERPL-MCNC: 45 MG/DL (ref 9–23)
BUN/CREAT SERPL: 5.3 (ref 10–20)
CALCIUM SERPL-MCNC: 9.9 MG/DL (ref 8.5–10.1)
CHLORIDE SERPL-SCNC: 95 MMOL/L (ref 98–107)
CO2 SERPL-SCNC: 26 MMOL/L (ref 20–30)
GLUCOSE SERPL-MCNC: 118 MG/DL (ref 74–106)
HCT VFR BLD AUTO: 33.8 % (ref 41–53)
HGB BLD-MCNC: 10.9 G/DL (ref 13.5–17.5)
INR PPP: 1.06 (ref 0.9–1.15)
MCH RBC QN AUTO: 26.2 PG (ref 28–32)
MCV RBC AUTO: 81.4 FL (ref 80–100)
NRBC BLD QL AUTO: 0.1 %
POTASSIUM SERPL-SCNC: 5.1 MMOL/L (ref 3.5–5.1)
PROTHROMBIN TIME: 11.1 SEC (ref 9.3–11.8)
SODIUM SERPL-SCNC: 132 MMOL/L (ref 136–145)

## 2023-12-12 PROCEDURE — 76937 US GUIDE VASCULAR ACCESS: CPT

## 2023-12-12 PROCEDURE — 77001 FLUOROGUIDE FOR VEIN DEVICE: CPT

## 2023-12-12 PROCEDURE — 80053 COMPREHEN METABOLIC PANEL: CPT

## 2023-12-12 PROCEDURE — 85025 COMPLETE CBC W/AUTO DIFF WBC: CPT

## 2023-12-12 PROCEDURE — 85610 PROTHROMBIN TIME: CPT

## 2023-12-12 PROCEDURE — 36415 COLL VENOUS BLD VENIPUNCTURE: CPT

## 2023-12-12 PROCEDURE — 36558 INSERT TUNNELED CV CATH: CPT

## 2023-12-12 PROCEDURE — 76000 FLUOROSCOPY <1 HR PHYS/QHP: CPT

## 2023-12-12 PROCEDURE — 85730 THROMBOPLASTIN TIME PARTIAL: CPT

## 2023-12-12 PROCEDURE — 90935 HEMODIALYSIS ONE EVALUATION: CPT

## 2023-12-12 PROCEDURE — 80048 BASIC METABOLIC PNL TOTAL CA: CPT

## 2023-12-12 PROCEDURE — 87081 CULTURE SCREEN ONLY: CPT

## 2023-12-13 VITALS
OXYGEN SATURATION: 98 % | DIASTOLIC BLOOD PRESSURE: 40 MMHG | RESPIRATION RATE: 19 BRPM | SYSTOLIC BLOOD PRESSURE: 134 MMHG | HEART RATE: 85 BPM | TEMPERATURE: 97.9 F

## 2023-12-13 VITALS
OXYGEN SATURATION: 100 % | SYSTOLIC BLOOD PRESSURE: 102 MMHG | TEMPERATURE: 97.6 F | RESPIRATION RATE: 18 BRPM | HEART RATE: 93 BPM | DIASTOLIC BLOOD PRESSURE: 54 MMHG

## 2023-12-13 VITALS — HEART RATE: 85 BPM | OXYGEN SATURATION: 98 % | RESPIRATION RATE: 19 BRPM

## 2023-12-13 VITALS
HEART RATE: 93 BPM | DIASTOLIC BLOOD PRESSURE: 54 MMHG | TEMPERATURE: 97.6 F | SYSTOLIC BLOOD PRESSURE: 101 MMHG | RESPIRATION RATE: 18 BRPM | OXYGEN SATURATION: 100 %

## 2023-12-13 VITALS
DIASTOLIC BLOOD PRESSURE: 55 MMHG | SYSTOLIC BLOOD PRESSURE: 122 MMHG | TEMPERATURE: 98.3 F | HEART RATE: 89 BPM | OXYGEN SATURATION: 95 % | RESPIRATION RATE: 19 BRPM

## 2023-12-13 VITALS — OXYGEN SATURATION: 98 % | HEART RATE: 98 BPM | RESPIRATION RATE: 18 BRPM

## 2023-12-13 LAB
ALBUMIN SERPL-MCNC: 4 G/DL (ref 3.2–4.8)
ALP SERPL-CCNC: 120 U/L (ref 46–116)
ALT SERPL-CCNC: 12 U/L (ref 7–40)
ANION GAP SERPL CALCULATED.3IONS-SCNC: 11 MMOL/L (ref 5–15)
BILIRUB SERPL-MCNC: 0.3 MG/DL (ref 0.2–1)
BUN SERPL-MCNC: 62 MG/DL (ref 9–23)
BUN/CREAT SERPL: 6.4 (ref 10–20)
CALCIUM SERPL-MCNC: 9.6 MG/DL (ref 8.5–10.1)
CHLORIDE SERPL-SCNC: 96 MMOL/L (ref 98–107)
CO2 SERPL-SCNC: 28 MMOL/L (ref 20–30)
GLUCOSE SERPL-MCNC: 111 MG/DL (ref 74–106)
HCT VFR BLD AUTO: 31.4 % (ref 41–53)
HGB BLD-MCNC: 10.2 G/DL (ref 13.5–17.5)
MCH RBC QN AUTO: 26.1 PG (ref 28–32)
MCV RBC AUTO: 80.3 FL (ref 80–100)
NRBC BLD QL AUTO: 0.1 %
POTASSIUM SERPL-SCNC: 5.9 MMOL/L (ref 3.5–5.1)
PROT SERPL-MCNC: 7.9 G/DL (ref 5.7–8.2)
SODIUM SERPL-SCNC: 135 MMOL/L (ref 136–145)

## 2023-12-13 PROCEDURE — 0J2TXYZ CHANGE OTHER DEVICE IN TRUNK SUBCUTANEOUS TISSUE AND FASCIA, EXTERNAL APPROACH: ICD-10-PCS | Performed by: RADIOLOGY

## 2023-12-13 PROCEDURE — 02HV33Z INSERTION OF INFUSION DEVICE INTO SUPERIOR VENA CAVA, PERCUTANEOUS APPROACH: ICD-10-PCS | Performed by: RADIOLOGY

## 2023-12-13 PROCEDURE — 5A1D70Z PERFORMANCE OF URINARY FILTRATION, INTERMITTENT, LESS THAN 6 HOURS PER DAY: ICD-10-PCS | Performed by: INTERNAL MEDICINE

## 2023-12-13 PROCEDURE — B548ZZA ULTRASONOGRAPHY OF SUPERIOR VENA CAVA, GUIDANCE: ICD-10-PCS | Performed by: RADIOLOGY

## 2023-12-13 PROCEDURE — B5181ZA FLUOROSCOPY OF SUPERIOR VENA CAVA USING LOW OSMOLAR CONTRAST, GUIDANCE: ICD-10-PCS | Performed by: RADIOLOGY

## 2023-12-13 RX ADMIN — LOSARTAN POTASSIUM SCH MG: 25 TABLET, FILM COATED ORAL at 10:00

## 2023-12-13 RX ADMIN — ATORVASTATIN CALCIUM SCH MG: 20 TABLET, FILM COATED ORAL at 12:20

## 2023-12-13 RX ADMIN — RENAGEL SCH MG: 800 TABLET ORAL at 21:47

## 2023-12-14 VITALS
TEMPERATURE: 98.5 F | HEART RATE: 80 BPM | RESPIRATION RATE: 20 BRPM | OXYGEN SATURATION: 97 % | SYSTOLIC BLOOD PRESSURE: 144 MMHG | DIASTOLIC BLOOD PRESSURE: 82 MMHG

## 2023-12-14 VITALS
TEMPERATURE: 98.3 F | SYSTOLIC BLOOD PRESSURE: 104 MMHG | HEART RATE: 90 BPM | OXYGEN SATURATION: 99 % | DIASTOLIC BLOOD PRESSURE: 46 MMHG | RESPIRATION RATE: 20 BRPM

## 2023-12-14 VITALS
DIASTOLIC BLOOD PRESSURE: 59 MMHG | OXYGEN SATURATION: 97 % | HEART RATE: 84 BPM | RESPIRATION RATE: 17 BRPM | TEMPERATURE: 98.6 F | SYSTOLIC BLOOD PRESSURE: 124 MMHG

## 2023-12-14 VITALS
DIASTOLIC BLOOD PRESSURE: 46 MMHG | HEART RATE: 90 BPM | SYSTOLIC BLOOD PRESSURE: 104 MMHG | TEMPERATURE: 98.2 F | OXYGEN SATURATION: 99 % | RESPIRATION RATE: 20 BRPM

## 2023-12-14 RX ADMIN — ATORVASTATIN CALCIUM SCH MG: 20 TABLET, FILM COATED ORAL at 08:29

## 2023-12-14 RX ADMIN — RENAGEL SCH MG: 800 TABLET ORAL at 12:00

## 2023-12-14 RX ADMIN — LOSARTAN POTASSIUM SCH MG: 25 TABLET, FILM COATED ORAL at 08:29

## 2023-12-14 RX ADMIN — RENAGEL SCH MG: 800 TABLET ORAL at 08:29
